# Patient Record
Sex: FEMALE | Race: WHITE | NOT HISPANIC OR LATINO | Employment: PART TIME | URBAN - METROPOLITAN AREA
[De-identification: names, ages, dates, MRNs, and addresses within clinical notes are randomized per-mention and may not be internally consistent; named-entity substitution may affect disease eponyms.]

---

## 2017-02-21 ENCOUNTER — TELEPHONE (OUTPATIENT)
Dept: OBSTETRICS AND GYNECOLOGY | Facility: CLINIC | Age: 22
End: 2017-02-21

## 2017-02-21 ENCOUNTER — LAB VISIT (OUTPATIENT)
Dept: LAB | Facility: HOSPITAL | Age: 22
End: 2017-02-21
Attending: OBSTETRICS & GYNECOLOGY
Payer: MEDICAID

## 2017-02-21 ENCOUNTER — OFFICE VISIT (OUTPATIENT)
Dept: OBSTETRICS AND GYNECOLOGY | Facility: CLINIC | Age: 22
End: 2017-02-21
Payer: MEDICAID

## 2017-02-21 VITALS
SYSTOLIC BLOOD PRESSURE: 122 MMHG | WEIGHT: 253.38 LBS | BODY MASS INDEX: 39.77 KG/M2 | HEART RATE: 69 BPM | RESPIRATION RATE: 14 BRPM | DIASTOLIC BLOOD PRESSURE: 74 MMHG | HEIGHT: 67 IN

## 2017-02-21 DIAGNOSIS — N93.9 ABNORMAL UTERINE BLEEDING: ICD-10-CM

## 2017-02-21 DIAGNOSIS — R11.0 NAUSEA: ICD-10-CM

## 2017-02-21 DIAGNOSIS — N93.9 ABNORMAL UTERINE BLEEDING: Primary | ICD-10-CM

## 2017-02-21 LAB
BASOPHILS # BLD AUTO: 0.09 K/UL
BASOPHILS NFR BLD: 1 %
DIFFERENTIAL METHOD: NORMAL
EOSINOPHIL # BLD AUTO: 0.5 K/UL
EOSINOPHIL NFR BLD: 5.8 %
ERYTHROCYTE [DISTWIDTH] IN BLOOD BY AUTOMATED COUNT: 13.8 %
HCT VFR BLD AUTO: 38 %
HGB BLD-MCNC: 12.4 G/DL
LYMPHOCYTES # BLD AUTO: 2.7 K/UL
LYMPHOCYTES NFR BLD: 31.9 %
MCH RBC QN AUTO: 28.1 PG
MCHC RBC AUTO-ENTMCNC: 32.6 %
MCV RBC AUTO: 86 FL
MONOCYTES # BLD AUTO: 0.7 K/UL
MONOCYTES NFR BLD: 8.6 %
NEUTROPHILS # BLD AUTO: 4.5 K/UL
NEUTROPHILS NFR BLD: 52.7 %
PLATELET # BLD AUTO: 238 K/UL
PMV BLD AUTO: 11.5 FL
RBC # BLD AUTO: 4.42 M/UL
WBC # BLD AUTO: 8.59 K/UL

## 2017-02-21 PROCEDURE — 85025 COMPLETE CBC W/AUTO DIFF WBC: CPT

## 2017-02-21 PROCEDURE — 99213 OFFICE O/P EST LOW 20 MIN: CPT | Mod: S$PBB,,, | Performed by: OBSTETRICS & GYNECOLOGY

## 2017-02-21 PROCEDURE — 36415 COLL VENOUS BLD VENIPUNCTURE: CPT

## 2017-02-21 PROCEDURE — 99999 PR PBB SHADOW E&M-EST. PATIENT-LVL III: CPT | Mod: PBBFAC,,, | Performed by: OBSTETRICS & GYNECOLOGY

## 2017-02-21 RX ORDER — PREDNISONE 10 MG/1
TABLET ORAL
Refills: 0 | COMMUNITY
Start: 2016-12-26 | End: 2017-02-21

## 2017-02-21 RX ORDER — FLUTICASONE PROPIONATE 50 MCG
SPRAY, SUSPENSION (ML) NASAL
Refills: 2 | COMMUNITY
Start: 2016-12-26 | End: 2017-02-21

## 2017-02-21 RX ORDER — CEFDINIR 300 MG/1
CAPSULE ORAL
Refills: 0 | COMMUNITY
Start: 2016-12-26 | End: 2017-02-21

## 2017-02-21 RX ORDER — ONDANSETRON 8 MG/1
TABLET, ORALLY DISINTEGRATING ORAL
Refills: 0 | COMMUNITY
Start: 2016-12-26 | End: 2017-02-21

## 2017-02-21 RX ORDER — MEDROXYPROGESTERONE ACETATE 10 MG/1
TABLET ORAL
Refills: 6 | COMMUNITY
Start: 2017-02-05 | End: 2017-11-27

## 2017-02-21 RX ORDER — NORETHINDRONE ACETATE AND ETHINYL ESTRADIOL 1.5-30(21)
KIT ORAL
Qty: 28 TABLET | Refills: 12 | Status: SHIPPED | OUTPATIENT
Start: 2017-02-21 | End: 2017-11-27

## 2017-02-21 RX ORDER — ONDANSETRON 4 MG/1
4 TABLET, ORALLY DISINTEGRATING ORAL EVERY 6 HOURS PRN
Qty: 30 TABLET | Refills: 0 | Status: SHIPPED | OUTPATIENT
Start: 2017-02-21 | End: 2017-11-27 | Stop reason: SDUPTHER

## 2017-02-21 NOTE — TELEPHONE ENCOUNTER
----- Message from Kelly Ortega MA sent at 2017 10:06 AM CST -----  Contact: Self   Gurjit Hernandez  MRN: [id number]  : 1995  PCP: Dong Calloway  Home Phone      [phone]  Work Phone      Not on file.  Mobile          [phone]      MESSAGE:  Patient would like to talk to a nurse about excesses bleeding with nausea and light headed please call the patient back [phone]. If you call the patient back she may be in class. She was calling on her break.

## 2017-02-21 NOTE — PROGRESS NOTES
Subjective:       Patient ID: Gurjit Hernandez is a 22 y.o. female.    Chief Complaint:  Vaginal Bleeding (pt states she is going trough a super plus tampon and a maxi pad a hr for 6days now, pt states she has been bleeding since the 9th but it has just recently gotten heavy with a lot of clots); Dysmenorrhea; Fatigue; and Weakness      History of Present Illness  HPI  Dysfunctional Uterine Bleeding  Patient complains of irregular menses. She had been bleeding infrequently and took a course of Provera after not having a cycle for 3 months. She is now bleeding every day for the past 13 days. She changes her pad or tampon every hour and has passage of clots. She also reports fatigue.    Reports that her PCP has blood work pending, including thyroid tests.  Will get results tomorrow.    GYN & OB History  Patient's last menstrual period was 02/09/2017.   Date of Last Pap: 12/12/2016    OB History   No data available       Review of Systems  Review of Systems   Constitutional: Positive for fatigue and unexpected weight change (unable to lose weight despite diet). Negative for chills, diaphoresis and fever.   HENT: Negative for congestion, hearing loss, rhinorrhea and sore throat.    Eyes: Negative for pain, discharge and visual disturbance.   Respiratory: Negative for apnea, cough, shortness of breath and wheezing.    Cardiovascular: Negative for chest pain, palpitations and leg swelling.   Gastrointestinal: Positive for nausea. Negative for abdominal pain, constipation, diarrhea and vomiting.   Endocrine: Negative for cold intolerance and heat intolerance.   Genitourinary: Positive for menstrual problem and vaginal bleeding. Negative for difficulty urinating, dyspareunia, dysuria, flank pain, frequency, genital sores, hematuria, pelvic pain, vaginal discharge and vaginal pain.   Musculoskeletal: Negative for arthralgias, back pain and joint swelling.   Skin: Negative for rash.   Neurological: Negative for dizziness,  weakness, light-headedness, numbness and headaches.   Psychiatric/Behavioral: Negative for agitation and confusion. The patient is not nervous/anxious.            Objective:    Physical Exam:   Constitutional: She is oriented to person, place, and time. She appears well-developed and well-nourished. No distress.    HENT:   Head: Normocephalic and atraumatic.    Eyes: Conjunctivae and EOM are normal.    Neck: Normal range of motion. Neck supple.     Pulmonary/Chest: Effort normal.        Abdominal: Soft. She exhibits no distension and no mass. There is no tenderness. There is no rebound and no guarding.             Musculoskeletal: Normal range of motion. She exhibits no deformity.       Neurological: She is alert and oriented to person, place, and time.    Skin: Skin is warm and dry.    Psychiatric: She has a normal mood and affect. Her behavior is normal. Judgment and thought content normal.          Assessment:        1. Abnormal uterine bleeding    2. Nausea             Plan:      Gurjit was seen today for vaginal bleeding, dysmenorrhea, fatigue and weakness.    Diagnoses and all orders for this visit:    Abnormal uterine bleeding  -     norethindrone-ethinyl estradiol-iron (MICROGESTIN FE1.5/30) 1.5 mg-30 mcg (21)/75 mg (7) tablet; Take 3 pills daily for 3 days, then 2 pills daily for 2 days, then one pill daily for all subsequent days and pill packs.  -     CBC auto differential; Future    Nausea  -     ondansetron (ZOFRAN-ODT) 4 MG TbDL; Take 1 tablet (4 mg total) by mouth every 6 (six) hours as needed (Nausea).    Follow up in 3 months or prn if symptoms worsen or fail to improve.

## 2017-02-21 NOTE — MR AVS SNAPSHOT
[facility]  [address] [place] [id number]  Phone: [phone]                  Gurjit Hernandez   2017 11:45 AM   Office Visit    Description:  Female : 1995   Provider:  Patricia Veloz MD   Department:  [facility]           Reason for Visit     Vaginal Bleeding     Dysmenorrhea     Fatigue     Weakness           Diagnoses this Visit        Comments    Abnormal uterine bleeding    -  Primary     Nausea                To Do List           Goals (5 Years of Data)     None      Follow-Up and Disposition     Return in about 3 months (around 2017).    Follow-up and Disposition History       These Medications        Disp Refills Start End    norethindrone-ethinyl estradiol-iron (MICROGESTIN FE1.5/30) 1.5 mg-30 mcg (21)/75 mg (7) tablet 28 tablet 12 2017     Take 3 pills daily for 3 days, then 2 pills daily for 2 days, then one pill daily for all subsequent days and pill packs.    Pharmacy: [facility] [address] [place] [address] Ph #: [phone]       ondansetron (ZOFRAN-ODT) 4 MG TbDL 30 tablet 0 2017 3/23/2017    Take 1 tablet (4 mg total) by mouth every 6 (six) hours as needed (Nausea). - Oral    [facility] [address] [address] Ph #: [phone]         [facility]     [facility] On Call Nurse Care Line -  Assistance  Registered nurses in the [facility] provide clinical advisement, health education, appointment booking, and other advisory services.  Call for this free service at [phone].             Medications           START taking these NEW medications        Refills    norethindrone-ethinyl estradiol-iron (MICROGESTIN FE1.5/30) 1.5 mg-30 mcg (21)/75 mg (7) tablet 12    Sig: Take 3 pills daily for 3 days, then 2 pills daily for 2 days, then one pill daily for all subsequent days and pill  "packs.    Class: Normal    ondansetron (ZOFRAN-ODT) 4 MG TbDL 0    Sig: Take 1 tablet (4 mg total) by mouth every 6 (six) hours as needed (Nausea).    Class: Normal    Route: Oral      STOP taking these medications     cefdinir (OMNICEF) 300 MG capsule TK ONE C PO  Q 12 H FOR 7 DAYS    fluticasone (FLONASE) 50 mcg/actuation nasal spray USE 2 SPRAYS IN EACH NOSTRIL BID    predniSONE (DELTASONE) 10 MG tablet TK 2 TS PO ONCE D FOR 7 DAYS           Verify that the below list of medications is an accurate representation of the medications you are currently taking.  If none reported, the list may be blank. If incorrect, please contact your healthcare provider. Carry this list with you in case of emergency.           Current Medications     buPROPion (WELLBUTRIN XL) 300 MG 24 hr tablet TK 1 T PO QAM    medroxyPROGESTERone (PROVERA) 10 MG tablet     norethindrone-ethinyl estradiol-iron (MICROGESTIN FE1.5/30) 1.5 mg-30 mcg (21)/75 mg (7) tablet Take 3 pills daily for 3 days, then 2 pills daily for 2 days, then one pill daily for all subsequent days and pill packs.    ondansetron (ZOFRAN-ODT) 4 MG TbDL Take 1 tablet (4 mg total) by mouth every 6 (six) hours as needed (Nausea).           Clinical Reference Information           Your Vitals Were     BP Pulse Resp Height Weight Last Period    122/74 69 14 5' 7" (1.702 m) 114.9 kg (253 lb 6.4 oz) 02/09/2017    BMI                39.69 kg/m2          Blood Pressure          Most Recent Value    BP  122/74      Allergies as of 2/21/2017     Bactrim [Sulfamethoxazole-trimethoprim]      Immunizations Administered on Date of Encounter - 2/21/2017     None      Orders Placed During Today's Visit     Future Labs/Procedures Expected by Expires    CBC auto differential  2/21/2017 4/22/2018      Language Assistance Services     ATTENTION: Language assistance services are available, free of charge. Please call [phone].      ATENCIÓN: Si dalilala reese, tiene a sharpe disposición servicios " deysios de asistencia lingüística. Héctor esteves [phone].     KEYONA Ý: N?u b?n nói Ti?ng Vi?t, có các d?ch v? h? tr? ngôn ng? mi?n phí dành cho b?n. G?i s? [phone].         [facility] complies with applicable Federal civil rights laws and does not discriminate on the basis of race, color, national origin, age, disability, or sex.

## 2017-02-21 NOTE — TELEPHONE ENCOUNTER
Patient states since she started Provera a few months ago she is experiencing irregular bleeding.Patient states she feels like something is wrong since starting Provera. Requesting evaluation today. Patient denies saturating a pad an hour, cramping, pain, fever, nausea, vomiting, or any other symptoms.

## 2017-11-27 ENCOUNTER — HOSPITAL ENCOUNTER (OUTPATIENT)
Dept: RADIOLOGY | Facility: HOSPITAL | Age: 22
Discharge: HOME | End: 2017-11-27
Attending: OBSTETRICS & GYNECOLOGY
Payer: MEDICAID

## 2017-11-27 ENCOUNTER — OFFICE VISIT (OUTPATIENT)
Dept: OBSTETRICS AND GYNECOLOGY | Facility: CLINIC | Age: 22
End: 2017-11-27
Payer: MEDICAID

## 2017-11-27 VITALS
HEIGHT: 67 IN | RESPIRATION RATE: 16 BRPM | WEIGHT: 248 LBS | SYSTOLIC BLOOD PRESSURE: 110 MMHG | BODY MASS INDEX: 38.92 KG/M2 | DIASTOLIC BLOOD PRESSURE: 78 MMHG | HEART RATE: 76 BPM

## 2017-11-27 DIAGNOSIS — R11.0 NAUSEA: ICD-10-CM

## 2017-11-27 DIAGNOSIS — E28.2 PCOS (POLYCYSTIC OVARIAN SYNDROME): Primary | ICD-10-CM

## 2017-11-27 DIAGNOSIS — R42 DIZZINESS: ICD-10-CM

## 2017-11-27 DIAGNOSIS — G43.109 MIGRAINE WITH AURA AND WITHOUT STATUS MIGRAINOSUS, NOT INTRACTABLE: ICD-10-CM

## 2017-11-27 DIAGNOSIS — N93.8 DUB (DYSFUNCTIONAL UTERINE BLEEDING): ICD-10-CM

## 2017-11-27 PROCEDURE — 99213 OFFICE O/P EST LOW 20 MIN: CPT | Mod: PBBFAC,25 | Performed by: OBSTETRICS & GYNECOLOGY

## 2017-11-27 PROCEDURE — 99213 OFFICE O/P EST LOW 20 MIN: CPT | Mod: S$PBB,,, | Performed by: OBSTETRICS & GYNECOLOGY

## 2017-11-27 PROCEDURE — 99999 PR PBB SHADOW E&M-EST. PATIENT-LVL III: CPT | Mod: PBBFAC,,, | Performed by: OBSTETRICS & GYNECOLOGY

## 2017-11-27 PROCEDURE — 70470 CT HEAD/BRAIN W/O & W/DYE: CPT | Mod: TC

## 2017-11-27 PROCEDURE — 70470 CT HEAD/BRAIN W/O & W/DYE: CPT | Mod: 26,,, | Performed by: RADIOLOGY

## 2017-11-27 PROCEDURE — 25500020 PHARM REV CODE 255: Performed by: OBSTETRICS & GYNECOLOGY

## 2017-11-27 RX ORDER — METFORMIN HYDROCHLORIDE 500 MG/1
500 TABLET, EXTENDED RELEASE ORAL
Qty: 30 TABLET | Refills: 11 | Status: SHIPPED | OUTPATIENT
Start: 2017-11-27 | End: 2019-02-14

## 2017-11-27 RX ORDER — NORETHINDRONE 5 MG/1
5 TABLET ORAL DAILY
Qty: 10 TABLET | Refills: 3 | Status: ON HOLD | OUTPATIENT
Start: 2017-11-27 | End: 2019-02-06 | Stop reason: HOSPADM

## 2017-11-27 RX ORDER — ONDANSETRON 4 MG/1
4 TABLET, ORALLY DISINTEGRATING ORAL EVERY 6 HOURS PRN
Qty: 30 TABLET | Refills: 0 | Status: SHIPPED | OUTPATIENT
Start: 2017-11-27 | End: 2018-05-04 | Stop reason: SDUPTHER

## 2017-11-27 RX ADMIN — IOHEXOL 100 ML: 350 INJECTION, SOLUTION INTRAVENOUS at 11:11

## 2017-11-27 NOTE — PROGRESS NOTES
Subjective:    Patient ID: Gurjit Hernandez is a 22 y.o. y.o. female    Chief Complaint:   Chief Complaint   Patient presents with    Headache    Dizziness     seeing spots    Nausea    Vaginal Bleeding     since 10/4/2017- heavy to light, crampng with clots        History of Present Illness:  Gurjit presents today for evaluation of an episode of dizziness, headache, nausea which began yesterday and lasted for about 3 hours. She states she had difficulty walking due to dizziness. She had one episode like this before about 8 months ago. She had been on OC's for PCOS shelia stopped in July. She has had irregular bleeding since that time and states she has had daily bleeding since early October. She denies pelvic pain.      Review of Systems   Constitutional: Negative for activity change, appetite change, chills, diaphoresis, fatigue, fever and unexpected weight change.   HENT: Negative for mouth sores and tinnitus.    Eyes: Negative for discharge and visual disturbance.   Respiratory: Negative for cough, shortness of breath and wheezing.    Cardiovascular: Negative for chest pain, palpitations and leg swelling.   Gastrointestinal: Negative for abdominal pain, blood in stool, constipation, diarrhea, nausea and vomiting.   Endocrine: Negative for diabetes, hair loss, hot flashes, hyperthyroidism and hypothyroidism.   Genitourinary: Positive for menstrual problem. Negative for decreased libido, dyspareunia, dysuria, flank pain, frequency, genital sores, hematuria, menorrhagia, pelvic pain, urgency, vaginal bleeding, vaginal discharge, vaginal pain, urinary incontinence, postcoital bleeding and vaginal odor.   Musculoskeletal: Negative for back pain, joint swelling and myalgias.   Skin:  Negative for rash, no acne and hair changes.   Neurological: Positive for headaches. Negative for seizures, syncope and numbness.        Dizziness   Hematological: Negative for adenopathy. Does not bruise/bleed easily.    Psychiatric/Behavioral: Negative for sleep disturbance. The patient is not nervous/anxious.    Breast: Negative for breast pain and nipple discharge          Objective:    Vital Signs:  Vitals:    11/27/17 0934   BP: 110/78   Pulse: 76   Resp: 16       Physical Exam:  General:  alert,normal appearing gravid female   Skin:  Skin color, texture, turgor normal. No rashes or lesions   Abdomen: soft, non-tender. Bowel sounds normal. No masses,  no organomegaly   Pelvis: External genitalia: normal general appearance  Urinary system: urethral meatus normal, bladder nontender  Vaginal: normal mucosa without prolapse or lesions  Cervix: normal appearance  Uterus: normal single, nontender  Adnexa: normal bimanual exam         Assessment:      1. PCOS (polycystic ovarian syndrome)    2. Dizziness    3. DUB (dysfunctional uterine bleeding)    4. Nausea    5. Migraine with aura and without status migrainosus, not intractable          Plan:      PCOS (polycystic ovarian syndrome)  -     norethindrone (AYGESTIN) 5 mg Tab; Take 1 tablet (5 mg total) by mouth once daily.  Dispense: 10 tablet; Refill: 3  -     metFORMIN (GLUCOPHAGE XR) 500 MG 24 hr tablet; Take 1 tablet (500 mg total) by mouth daily with breakfast.  Dispense: 30 tablet; Refill: 11    Dizziness  -     CT Head W Wo Contrast; Future; Expected date: 11/27/2017    DUB (dysfunctional uterine bleeding)  -     CBC auto differential; Future; Expected date: 11/27/2017    Nausea  -     ondansetron (ZOFRAN-ODT) 4 MG TbDL; Take 1 tablet (4 mg total) by mouth every 6 (six) hours as needed (Nausea).  Dispense: 30 tablet; Refill: 0    Migraine with aura and without status migrainosus, not intractable      We discussed taking metformin as I am cautious to start OC's with this type of migraine. If repeats, I will refer to neurology.

## 2017-11-29 ENCOUNTER — TELEPHONE (OUTPATIENT)
Dept: OBSTETRICS AND GYNECOLOGY | Facility: CLINIC | Age: 22
End: 2017-11-29

## 2017-11-29 NOTE — TELEPHONE ENCOUNTER
Pt seen in clinic 11/27/17 states she was instructed to contact office if migraine symptoms continued. States headaches have not gotten better, has no relief from tylenol. Also has fatigue. Pt informed MD not in office today, but message will be sent for review. Also instructed to schedule appt with PCP for soonest available for further evaluation. verbalized understanding. Please advise.

## 2017-11-29 NOTE — TELEPHONE ENCOUNTER
----- Message from Surekha Muniz MA sent at 11/29/2017 10:01 AM CST -----  Patient saw DR. Santos on 11/27/2017 due to increase in headaches with the increase in hormones. The patient states that the physician told her to call back if the headaches continues with no relief. She is still having problems.Her pharmacy is [facility] in [place] and her call back number is [phone].

## 2018-05-01 ENCOUNTER — TELEPHONE (OUTPATIENT)
Dept: OBSTETRICS AND GYNECOLOGY | Facility: CLINIC | Age: 23
End: 2018-05-01

## 2018-05-01 NOTE — TELEPHONE ENCOUNTER
I have spoken with pharmacist Osmani who states Aygestin is in stock and will be filled today. Left message for patient to contact office.

## 2018-05-01 NOTE — TELEPHONE ENCOUNTER
----- Message from Salima Red MA sent at 5/1/2018  1:02 PM CDT -----  Contact: self  Gurjit Hernandez  MRN: [id number]  Home Phone      [phone]  Work Phone      Not on file.  Mobile          [phone]    Patient Care Team:  Dong Calloway MD as PCP - General (Family Medicine)  Patricia Veloz MD as Consulting Physician (Obstetrics and Gynecology)  OB? No  What phone number can you be reached at?   [phone]  Message:   Following up on Hormone medication that was to be called in back in February.    Pharmacy:  [facility] [address]

## 2018-05-04 ENCOUNTER — TELEPHONE (OUTPATIENT)
Dept: OBSTETRICS AND GYNECOLOGY | Facility: CLINIC | Age: 23
End: 2018-05-04

## 2018-05-04 DIAGNOSIS — R11.0 NAUSEA: ICD-10-CM

## 2018-05-04 RX ORDER — ONDANSETRON 4 MG/1
4 TABLET, ORALLY DISINTEGRATING ORAL EVERY 6 HOURS PRN
Qty: 30 TABLET | Refills: 0 | Status: SHIPPED | OUTPATIENT
Start: 2018-05-04 | End: 2018-06-03

## 2018-05-04 NOTE — TELEPHONE ENCOUNTER
Pt requesting a rx for nausea medication. OTC medications have not helped. LOV 11/27/17    Calvin Mcdaniel

## 2018-05-04 NOTE — TELEPHONE ENCOUNTER
Please advise Gurjit that I have refilled her medication to help her over the weekend, however, if she persists with nausea, I would recommend she see her family doctor for evaluation.

## 2018-05-04 NOTE — TELEPHONE ENCOUNTER
----- Message from Emilee Guo sent at 5/4/2018 12:17 PM CDT -----  Pt would like something for nausea called into [facility] in [place] on [address]. Pt can be contacted at [phone].

## 2018-05-21 ENCOUNTER — OFFICE VISIT (OUTPATIENT)
Dept: URGENT CARE | Facility: CLINIC | Age: 23
End: 2018-05-21
Payer: MEDICAID

## 2018-05-21 VITALS
TEMPERATURE: 98 F | HEART RATE: 101 BPM | OXYGEN SATURATION: 99 % | DIASTOLIC BLOOD PRESSURE: 82 MMHG | WEIGHT: 240 LBS | HEIGHT: 67 IN | SYSTOLIC BLOOD PRESSURE: 132 MMHG | RESPIRATION RATE: 20 BRPM | BODY MASS INDEX: 37.67 KG/M2

## 2018-05-21 DIAGNOSIS — J98.01 BRONCHOSPASM: ICD-10-CM

## 2018-05-21 DIAGNOSIS — B34.9 VIRAL SYNDROME: Primary | ICD-10-CM

## 2018-05-21 PROCEDURE — 94640 AIRWAY INHALATION TREATMENT: CPT | Mod: S$GLB,,, | Performed by: FAMILY MEDICINE

## 2018-05-21 PROCEDURE — 99204 OFFICE O/P NEW MOD 45 MIN: CPT | Mod: S$GLB,,, | Performed by: PHYSICIAN ASSISTANT

## 2018-05-21 RX ORDER — PROMETHAZINE HYDROCHLORIDE AND PHENYLEPHRINE HYDROCHLORIDE 6.25; 5 MG/5ML; MG/5ML
5 SYRUP ORAL 4 TIMES DAILY PRN
Qty: 118 ML | Refills: 0 | Status: SHIPPED | OUTPATIENT
Start: 2018-05-21 | End: 2018-05-31

## 2018-05-21 RX ORDER — ALBUTEROL SULFATE 0.83 MG/ML
SOLUTION RESPIRATORY (INHALATION)
COMMUNITY
Start: 2018-04-11

## 2018-05-21 RX ORDER — DEXAMETHASONE SODIUM PHOSPHATE 100 MG/10ML
8 INJECTION INTRAMUSCULAR; INTRAVENOUS
Status: COMPLETED | OUTPATIENT
Start: 2018-05-21 | End: 2018-05-21

## 2018-05-21 RX ORDER — AZELASTINE 1 MG/ML
1 SPRAY, METERED NASAL 2 TIMES DAILY
Qty: 30 ML | Refills: 0 | Status: SHIPPED | OUTPATIENT
Start: 2018-05-21 | End: 2019-05-21

## 2018-05-21 RX ORDER — IPRATROPIUM BROMIDE 0.5 MG/2.5ML
0.5 SOLUTION RESPIRATORY (INHALATION)
Status: COMPLETED | OUTPATIENT
Start: 2018-05-21 | End: 2018-05-21

## 2018-05-21 RX ORDER — ALBUTEROL SULFATE 0.83 MG/ML
2.5 SOLUTION RESPIRATORY (INHALATION)
Status: COMPLETED | OUTPATIENT
Start: 2018-05-21 | End: 2018-05-21

## 2018-05-21 RX ORDER — BENZONATATE 200 MG/1
200 CAPSULE ORAL 3 TIMES DAILY PRN
Qty: 30 CAPSULE | Refills: 0 | Status: SHIPPED | OUTPATIENT
Start: 2018-05-21 | End: 2018-05-31

## 2018-05-21 RX ORDER — ALBUTEROL SULFATE 90 UG/1
2 AEROSOL, METERED RESPIRATORY (INHALATION) EVERY 4 HOURS PRN
COMMUNITY
Start: 2018-04-11 | End: 2019-07-12 | Stop reason: SDUPTHER

## 2018-05-21 RX ADMIN — DEXAMETHASONE SODIUM PHOSPHATE 8 MG: 100 INJECTION INTRAMUSCULAR; INTRAVENOUS at 11:05

## 2018-05-21 RX ADMIN — IPRATROPIUM BROMIDE 0.5 MG: 0.5 SOLUTION RESPIRATORY (INHALATION) at 11:05

## 2018-05-21 RX ADMIN — ALBUTEROL SULFATE 2.5 MG: 0.83 SOLUTION RESPIRATORY (INHALATION) at 11:05

## 2018-05-21 NOTE — PROGRESS NOTES
"Subjective:       Patient ID: Gurjit Hernandez is a 23 y.o. female.    Vitals:  height is 5' 7" (1.702 m) and weight is 108.9 kg (240 lb). Her oral temperature is 98.3 °F (36.8 °C). Her blood pressure is 132/82 and her pulse is 101. Her respiration is 20 and oxygen saturation is 99%.     Chief Complaint: Cough    Cough   This is a new problem. The current episode started yesterday. The problem has been gradually worsening. The problem occurs constantly. The cough is productive of purulent sputum. Associated symptoms include nasal congestion, postnasal drip and wheezing. Pertinent negatives include no chest pain, chills, ear pain, eye redness, fever, headaches, myalgias, sore throat or shortness of breath. Nothing aggravates the symptoms. She has tried OTC inhaler and OTC cough suppressant for the symptoms. The treatment provided mild relief.     Review of Systems   Constitution: Negative for chills, fever and malaise/fatigue.   HENT: Positive for congestion and postnasal drip. Negative for ear pain, hoarse voice and sore throat.    Eyes: Negative for discharge and redness.   Cardiovascular: Negative for chest pain, dyspnea on exertion and leg swelling.   Respiratory: Positive for cough, sputum production and wheezing. Negative for shortness of breath.    Musculoskeletal: Negative for myalgias.   Gastrointestinal: Negative for abdominal pain and nausea.   Neurological: Negative for headaches.       Objective:      Physical Exam   Constitutional: She is oriented to person, place, and time. She appears well-developed and well-nourished. She is cooperative.  Non-toxic appearance. She does not appear ill. No distress.   HENT:   Head: Normocephalic and atraumatic.   Right Ear: Hearing, tympanic membrane, external ear and ear canal normal.   Left Ear: Hearing, tympanic membrane, external ear and ear canal normal.   Nose: Mucosal edema present. No rhinorrhea or nasal deformity. No epistaxis. Right sinus exhibits no " maxillary sinus tenderness and no frontal sinus tenderness. Left sinus exhibits no maxillary sinus tenderness and no frontal sinus tenderness.   Mouth/Throat: Uvula is midline and mucous membranes are normal. No trismus in the jaw. Normal dentition. No uvula swelling. No posterior oropharyngeal erythema (post nasal drainage present).   Eyes: Conjunctivae and lids are normal. No scleral icterus.   Sclera clear bilat   Neck: Trachea normal, full passive range of motion without pain and phonation normal. Neck supple.   Cardiovascular: Normal rate, regular rhythm, normal heart sounds, intact distal pulses and normal pulses.    Pulmonary/Chest: Effort normal. No respiratory distress. She has no decreased breath sounds. She has wheezes. She has no rhonchi. She has no rales.   No wheezes post duoneb.   Abdominal: Soft. Normal appearance and bowel sounds are normal. She exhibits no distension. There is no tenderness.   Musculoskeletal: Normal range of motion. She exhibits no edema or deformity.   Neurological: She is alert and oriented to person, place, and time. She exhibits normal muscle tone. Coordination normal.   Skin: Skin is warm, dry and intact. She is not diaphoretic. No pallor.   Psychiatric: She has a normal mood and affect. Her speech is normal and behavior is normal. Judgment and thought content normal. Cognition and memory are normal.   Nursing note and vitals reviewed.      Assessment:       1. Viral syndrome    2. Bronchospasm        Plan:         Viral syndrome    Bronchospasm  -     albuterol nebulizer solution 2.5 mg; Take 3 mLs (2.5 mg total) by nebulization one time.  -     ipratropium 0.02 % nebulizer solution 0.5 mg; Take 2.5 mLs (0.5 mg total) by nebulization one time.    Other orders  -     azelastine (ASTELIN) 137 mcg (0.1 %) nasal spray; 1 spray (137 mcg total) by Nasal route 2 (two) times daily.  Dispense: 30 mL; Refill: 0  -     dexamethasone injection 8 mg; Inject 0.8 mLs (8 mg total) into the  "muscle one time.  -     promethazine-phenylephrine (PROMETHAZINE VC) 6.25-5 mg/5 mL Syrp; Take 5 mLs by mouth 4 (four) times daily as needed.  Dispense: 118 mL; Refill: 0  -     benzonatate (TESSALON) 200 MG capsule; Take 1 capsule (200 mg total) by mouth 3 (three) times daily as needed for Cough.  Dispense: 30 capsule; Refill: 0      Patient Instructions   · Follow up with primary care or a specialist, or you may return here.  · If you were prescribed antibiotics, please take them to completion.  · If you were prescribed a narcotic or any medication with sedative effects, do not drive or operate heavy equipment or machinery while taking these medications.  · Please go to the Emergency Department for worsening symptoms, or possibly life threatening conditions as discussed.                                        If you  smoke, please stop smoking        Viral Syndrome (Adult)  A viral illness may cause a number of symptoms. The symptoms depend on the part of the body that the virus affects. If it settles in your nose, throat, and lungs, it may cause cough, sore throat, congestion, and sometimes headache. If it settles in your stomach and intestinal tract, it may cause vomiting and diarrhea. Sometimes it causes vague symptoms like "aching all over," feeling tired, loss of appetite, or fever.  A viral illness usually lasts 1 to 2 weeks, but sometimes it lasts longer. In some cases, a more serious infection can look like a viral syndrome in the first few days of the illness. You may need another exam and additional tests to know the difference. Watch for the warning signs listed below.  Home care  Follow these guidelines for taking care of yourself at home:  · If symptoms are severe, rest at home for the first 2 to 3 days.  · Stay away from cigarette smoke - both your smoke and the smoke from others.  · You may use over-the-counter acetaminophen or ibuprofen for fever, muscle aching, and headache, unless another " medicine was prescribed for this. If you have chronic liver or kidney disease or ever had a stomach ulcer or GI bleeding, talk with your doctor before using these medicines. No one who is younger than 18 and ill with a fever should take aspirin. It may cause severe disease or death.  · Your appetite may be poor, so a light diet is fine. Avoid dehydration by drinking 8 to 12 8-ounce glasses of fluids each day. This may include water; orange juice; lemonade; apple, grape, and cranberry juice; clear fruit drinks; electrolyte replacement and sports drinks; and decaffeinated teas and coffee. If you have been diagnosed with a kidney disease, ask your doctor how much and what types of fluids you should drink to prevent dehydration. If you have kidney disease, drinking too much fluid can cause it build up in the your body and be dangerous to your health.  · Over-the-counter remedies won't shorten the length of the illness but may be helpful for cough, sore throat; and nasal and sinus congestion. Don't use decongestants if you have high blood pressure.  Follow-up care  Follow up with your healthcare provider if you do not improve over the next week.  Call 911  Get emergency medical care if any of the following occur:  · Convulsion  · Feeling weak, dizzy, or like you are going to faint  · Chest pain, shortness of breath, wheezing, or difficulty breathing  When to seek medical advice  Call your healthcare provider right away if any of these occur:  · Cough with lots of colored sputum (mucus) or blood in your sputum  · Chest pain, shortness of breath, wheezing, or difficulty breathing  · Severe headache; face, neck, or ear pain  · Severe, constant pain in the lower right side of your belly (abdominal)  · Continued vomiting (cant keep liquids down)  · Frequent diarrhea (more than 5 times a day); blood (red or black color) or mucus in diarrhea  · Feeling weak, dizzy, or like you are going to faint  · Extreme thirst  · Fever of  100.4°F (38°C) or higher, or as directed by your healthcare provider  Date Last Reviewed: 9/25/2015  © 9045-2876 [employer]. [address] [place] [address], [place] [address]. All rights reserved. This information is not intended as a substitute for professional medical care. Always follow your healthcare professional's instructions.            Bronchospasm (Adult)    Bronchospasm occurs when the airways (bronchial tubes) go into spasm and contract. This makes it hard to breathe and causes wheezing (a high-pitched whistling sound). Bronchospasm can also cause frequent coughing without wheezing.  Bronchospasm is due to irritation, inflammation, or allergic reaction of the airways. People with asthma get bronchospasm. However, not everyone with bronchospasm has asthma.  Being exposed to harmful fumes, a recent case of bronchitis, exercise, or a flare-up of chronic obstructive pulmonary disease (COPD) may cause the airways to spasm. An episode of bronchospasm may last 7 to 14 days. Medicine may be prescribed to relax the airways and prevent wheezing. Antibiotics will be prescribed only if your healthcare provider thinks there is a bacterial infection. Antibiotics do not help a viral infection.  Home care  · Drink lots of water or other fluids (at least 10 glasses a day) during an attack. This will loosen lung secretions and make it easier to breathe. If you have heart or kidney disease, check with your doctor before you drink extra fluids.  · Take prescribed medicine exactly at the times advised. If you take an inhaled medicine to help with breathing, do not use it more than once every 4 hours, unless told to do so. If prescribed an antibiotic or prednisone, take all of the medicine, even if you are feeling better after a few days.  · Do not smoke. Also avoid being exposed to secondhand smoke.  · If you were given an inhaler, use it exactly as directed. If you need to use it more often than prescribed, your  condition may be getting worse. Contact your healthcare provider.  Follow-up care  Follow up with your healthcare provider, or as advised.  Note: If you are age 65 or older, have a chronic lung disease or condition that affects your immune system, or you smoke, we recommend getting pneumococcal vaccinations, as well as an influenza vaccination (flu shot) every autumn. Ask your healthcare provider about this.  When to seek medical advice  Call your healthcare provider right away if any of these occur:  · You need to use your inhalers more often than usual.  · You develop a fever of 100.4°F (38°C) or higher.  · You are coughing up lots of dark-colored sputum (mucus).  · You do not start to improve within 24 hours.  Call 911, or get immediate medical care  Contact emergency services if any of these occur:  · Coughing up bloody sputum (mucus)  · Chest pain with each breath  · Increased wheezing or shortness of breath  Date Last Reviewed: 9/13/2015  © 0174-1312 [employer]. [address] [place] [address], [place] [address]. All rights reserved. This information is not intended as a substitute for professional medical care. Always follow your healthcare professional's instructions.

## 2018-05-21 NOTE — LETTER
May 21, 2018      [facility] [place]  [address], [address]  [place] [address]  Phone: [phone]  Fax: [phone]       Patient: Gurjit Hernandez   YOB: 1995  Date of Visit: 05/21/2018    To Whom It May Concern:    Cal Hernandez  was at [facility] on 05/21/2018. She may return to work/school on 5/22/2018 with no restrictions. If you have any questions or concerns, or if I can be of further assistance, please do not hesitate to contact me.    Sincerely,    Abiodun Loera PA-C

## 2018-05-21 NOTE — PATIENT INSTRUCTIONS
"· Follow up with primary care or a specialist, or you may return here.  · If you were prescribed antibiotics, please take them to completion.  · If you were prescribed a narcotic or any medication with sedative effects, do not drive or operate heavy equipment or machinery while taking these medications.  · Please go to the Emergency Department for worsening symptoms, or possibly life threatening conditions as discussed.                                        If you  smoke, please stop smoking        Viral Syndrome (Adult)  A viral illness may cause a number of symptoms. The symptoms depend on the part of the body that the virus affects. If it settles in your nose, throat, and lungs, it may cause cough, sore throat, congestion, and sometimes headache. If it settles in your stomach and intestinal tract, it may cause vomiting and diarrhea. Sometimes it causes vague symptoms like "aching all over," feeling tired, loss of appetite, or fever.  A viral illness usually lasts 1 to 2 weeks, but sometimes it lasts longer. In some cases, a more serious infection can look like a viral syndrome in the first few days of the illness. You may need another exam and additional tests to know the difference. Watch for the warning signs listed below.  Home care  Follow these guidelines for taking care of yourself at home:  · If symptoms are severe, rest at home for the first 2 to 3 days.  · Stay away from cigarette smoke - both your smoke and the smoke from others.  · You may use over-the-counter acetaminophen or ibuprofen for fever, muscle aching, and headache, unless another medicine was prescribed for this. If you have chronic liver or kidney disease or ever had a stomach ulcer or GI bleeding, talk with your doctor before using these medicines. No one who is younger than 18 and ill with a fever should take aspirin. It may cause severe disease or death.  · Your appetite may be poor, so a light diet is fine. Avoid dehydration by " drinking 8 to 12 8-ounce glasses of fluids each day. This may include water; orange juice; lemonade; apple, grape, and cranberry juice; clear fruit drinks; electrolyte replacement and sports drinks; and decaffeinated teas and coffee. If you have been diagnosed with a kidney disease, ask your doctor how much and what types of fluids you should drink to prevent dehydration. If you have kidney disease, drinking too much fluid can cause it build up in the your body and be dangerous to your health.  · Over-the-counter remedies won't shorten the length of the illness but may be helpful for cough, sore throat; and nasal and sinus congestion. Don't use decongestants if you have high blood pressure.  Follow-up care  Follow up with your healthcare provider if you do not improve over the next week.  Call 911  Get emergency medical care if any of the following occur:  · Convulsion  · Feeling weak, dizzy, or like you are going to faint  · Chest pain, shortness of breath, wheezing, or difficulty breathing  When to seek medical advice  Call your healthcare provider right away if any of these occur:  · Cough with lots of colored sputum (mucus) or blood in your sputum  · Chest pain, shortness of breath, wheezing, or difficulty breathing  · Severe headache; face, neck, or ear pain  · Severe, constant pain in the lower right side of your belly (abdominal)  · Continued vomiting (cant keep liquids down)  · Frequent diarrhea (more than 5 times a day); blood (red or black color) or mucus in diarrhea  · Feeling weak, dizzy, or like you are going to faint  · Extreme thirst  · Fever of 100.4°F (38°C) or higher, or as directed by your healthcare provider  Date Last Reviewed: 9/25/2015  © 0425-1961 [employer]. [address] [place] [address], [place] [address]. All rights reserved. This information is not intended as a substitute for professional medical care. Always follow your healthcare professional's  instructions.            Bronchospasm (Adult)    Bronchospasm occurs when the airways (bronchial tubes) go into spasm and contract. This makes it hard to breathe and causes wheezing (a high-pitched whistling sound). Bronchospasm can also cause frequent coughing without wheezing.  Bronchospasm is due to irritation, inflammation, or allergic reaction of the airways. People with asthma get bronchospasm. However, not everyone with bronchospasm has asthma.  Being exposed to harmful fumes, a recent case of bronchitis, exercise, or a flare-up of chronic obstructive pulmonary disease (COPD) may cause the airways to spasm. An episode of bronchospasm may last 7 to 14 days. Medicine may be prescribed to relax the airways and prevent wheezing. Antibiotics will be prescribed only if your healthcare provider thinks there is a bacterial infection. Antibiotics do not help a viral infection.  Home care  · Drink lots of water or other fluids (at least 10 glasses a day) during an attack. This will loosen lung secretions and make it easier to breathe. If you have heart or kidney disease, check with your doctor before you drink extra fluids.  · Take prescribed medicine exactly at the times advised. If you take an inhaled medicine to help with breathing, do not use it more than once every 4 hours, unless told to do so. If prescribed an antibiotic or prednisone, take all of the medicine, even if you are feeling better after a few days.  · Do not smoke. Also avoid being exposed to secondhand smoke.  · If you were given an inhaler, use it exactly as directed. If you need to use it more often than prescribed, your condition may be getting worse. Contact your healthcare provider.  Follow-up care  Follow up with your healthcare provider, or as advised.  Note: If you are age 65 or older, have a chronic lung disease or condition that affects your immune system, or you smoke, we recommend getting pneumococcal vaccinations, as well as an influenza  vaccination (flu shot) every autumn. Ask your healthcare provider about this.  When to seek medical advice  Call your healthcare provider right away if any of these occur:  · You need to use your inhalers more often than usual.  · You develop a fever of 100.4°F (38°C) or higher.  · You are coughing up lots of dark-colored sputum (mucus).  · You do not start to improve within 24 hours.  Call 911, or get immediate medical care  Contact emergency services if any of these occur:  · Coughing up bloody sputum (mucus)  · Chest pain with each breath  · Increased wheezing or shortness of breath  Date Last Reviewed: 9/13/2015  © 0560-2429 [employer]. [address] [place] [address], [place] [address]. All rights reserved. This information is not intended as a substitute for professional medical care. Always follow your healthcare professional's instructions.

## 2018-12-17 ENCOUNTER — OFFICE VISIT (OUTPATIENT)
Dept: URGENT CARE | Facility: CLINIC | Age: 23
End: 2018-12-17
Payer: COMMERCIAL

## 2018-12-17 VITALS
BODY MASS INDEX: 37.67 KG/M2 | HEIGHT: 67 IN | HEART RATE: 89 BPM | TEMPERATURE: 98 F | DIASTOLIC BLOOD PRESSURE: 81 MMHG | OXYGEN SATURATION: 99 % | WEIGHT: 240 LBS | SYSTOLIC BLOOD PRESSURE: 120 MMHG

## 2018-12-17 DIAGNOSIS — H00.014 HORDEOLUM EXTERNUM OF LEFT UPPER EYELID: Primary | ICD-10-CM

## 2018-12-17 PROCEDURE — 3008F BODY MASS INDEX DOCD: CPT | Mod: CPTII,S$GLB,, | Performed by: NURSE PRACTITIONER

## 2018-12-17 PROCEDURE — 99213 OFFICE O/P EST LOW 20 MIN: CPT | Mod: S$GLB,,, | Performed by: NURSE PRACTITIONER

## 2018-12-17 RX ORDER — ERYTHROMYCIN 5 MG/G
OINTMENT OPHTHALMIC EVERY 8 HOURS
Qty: 1 TUBE | Refills: 0 | Status: SHIPPED | OUTPATIENT
Start: 2018-12-17 | End: 2018-12-24

## 2018-12-17 NOTE — LETTER
December 17, 2018      [facility]   [place]  [address] [facility] [facility] [address]  Phone: [phone]  Fax: [phone]       Patient: Gurjit Xiong   YOB: 1995  Date of Visit: 12/17/2018    To Whom It May Concern:    Cal Xiong  was at [facility] on 12/17/2018. She may return to work/school on 12/18/18 with no restrictions. If you have any questions or concerns, or if I can be of further assistance, please do not hesitate to contact me.    Sincerely,    Annabelle Bermudez NP

## 2018-12-17 NOTE — PATIENT INSTRUCTIONS
Sty (or Stye)  A sty is an infection of the oil gland of the eyelid. It may develop into a small pocket of pus (abscess). This can cause pain, redness, and swelling. In early stages, styes are treated with antibiotic cream, eye drops, or warm packs (small towels soaked in warm water). More severe cases may need to be opened and drained by a health care provider.  Home care  · Eye drops or ointment are usually prescribed to treat the infection. Use these as directed.   ¨ Artificial tears may also be used to lubricate the eye and make it more comfortable. These may be purchased without a prescription.   ¨ Talk to your health care provider before using any over-the-counter treatment for a sty.  · Apply a warm, damp towel to the affected eye for at least 5 minutes, 3 to 4 times a day for a week. Warm compresses open the pores and speed the healing. If the compresses are too hot, they may burn your eyelid.  · Sometimes the sty will drain with this treatment alone. If this happens, continue the antibiotic until all the redness and swelling are gone.  · Wash your hands before and after touching the infected eye to avoid spreading the infection.  · Do not squeeze or try to puncture the sty.  Follow-up care  Follow up with your health care provider, or as advised.   When to seek medical advice  Call your health care provider right away if you have:  · Increase in swelling or redness around the eyelid after 48 to 72 hours  · Increase in eye pain or the eyelid blisters  · Increase in warmth--the eyelid feels hot  · Drainage of blood or thick pus from the sty  · Blister on the eyelid  · Inability to open the eyelid due to swelling  · Fever  ¨ 1 degree above your normal temperature lasting for 24 to 48 hours, or  ¨ Whatever your health care provider told you to report based on your medical condition  · Vision changes  · Headache or stiff neck  · Recurrence of the sty  Date Last Reviewed: 6/14/2015  © 0988-6896 [employer]. [address] [place] [address], [place] [address]. All rights reserved. This information is not intended as a substitute for professional medical care. Always follow your healthcare professional's instructions.      1.  Take all medications as directed. If you have been prescribed antibiotics, make sure to complete them.   2.  Rest and keep yourself/patient well hydrated. For adults, it is recommended to drink at least 8-10 glasses of water daily.   3.  For patients above 6 months of age who are not allergic to and are not on anticoagulants, you can alternate Tylenol and Motrin every 4-6 hours for fever above 100.4F and/or pain.  For patients less than 6 months of age, allergic to or intolerant to NSAIDS, have gastritis, gastric ulcers, or history of GI bleeds, are pregnant, or are on anticoagulant therapy, you can take Tylenol every 4 hours as needed for fever above 100.4F and/or pain.   4. You should schedule a follow-up appointment with your Primary Care Provider/Pediatrician for recheck in 2-3 days or as directed at this visit.   5.  If your condition fails to improve in a timely manner, you should receive another evaluation by your Primary Care Provider/Pediatrician to discuss your concerns or return to urgent care for a recheck.  If your condition worsens at any time, you should report immediately to your nearest Emergency Department for further evaluation. **You must understand that you have received Urgent Care treatment only and that you may be released before all of your medical problems are known or treated. You, the patient, are responsible to arrange for follow-up care as instructed.

## 2018-12-17 NOTE — PROGRESS NOTES
"Subjective:       Patient ID: Gurjit Xiong is a 23 y.o. female.    Vitals:  height is 5' 7" (1.702 m) and weight is 108.9 kg (240 lb). Her oral temperature is 98.2 °F (36.8 °C). Her blood pressure is 120/81 and her pulse is 89. Her oxygen saturation is 99%.     Chief Complaint: Eye Problem and Sinus Problem    Eye Problem    The right eye is affected. The current episode started in the past 7 days (3 days). The problem occurs constantly. The problem has been gradually worsening. There was no injury mechanism. The pain is moderate. There is no known exposure to pink eye. She does not wear contacts. Associated symptoms include blurred vision, an eye discharge, eye redness, a foreign body sensation and itching. Pertinent negatives include no double vision, fever, nausea, photophobia or vomiting. She has tried nothing for the symptoms.   Sinus Problem   Pertinent negatives include no chills, congestion or headaches.       Constitution: Negative for chills and fever.   HENT: Negative for congestion and sinus pain.    Eyes: Positive for eye discharge, eye itching, eye redness and blurred vision. Negative for eye trauma, foreign body in eye, eye pain, photophobia, vision loss, double vision and eyelid swelling.   Gastrointestinal: Negative for nausea and vomiting.   Genitourinary: Negative for history of kidney stones.   Skin: Negative for rash.   Allergic/Immunologic: Negative for seasonal allergies and itching.   Neurological: Negative for headaches.       Objective:      Physical Exam   Constitutional: She is oriented to person, place, and time. She appears well-developed and well-nourished.   HENT:   Head: Normocephalic and atraumatic.   Right Ear: External ear normal.   Left Ear: External ear normal.   Nose: Nose normal.   Mouth/Throat: Oropharynx is clear and moist.   Eyes: Conjunctivae, EOM and lids are normal. Pupils are equal, round, and reactive to light.       Neck: Trachea normal, full passive range " of motion without pain and phonation normal. Neck supple.   Musculoskeletal: Normal range of motion.   Neurological: She is alert and oriented to person, place, and time.   Skin: Skin is warm, dry and intact.   Psychiatric: She has a normal mood and affect. Her speech is normal and behavior is normal. Judgment and thought content normal. Cognition and memory are normal.   Nursing note and vitals reviewed.      Assessment:       1. Hordeolum externum of left upper eyelid        Plan:         Hordeolum externum of left upper eyelid  -     erythromycin (ROMYCIN) ophthalmic ointment; Place into the left eye every 8 (eight) hours. for 7 days  Dispense: 1 Tube; Refill: 0

## 2018-12-27 ENCOUNTER — OFFICE VISIT (OUTPATIENT)
Dept: URGENT CARE | Facility: CLINIC | Age: 23
End: 2018-12-27
Payer: COMMERCIAL

## 2018-12-27 VITALS
BODY MASS INDEX: 37.67 KG/M2 | RESPIRATION RATE: 16 BRPM | DIASTOLIC BLOOD PRESSURE: 90 MMHG | TEMPERATURE: 98 F | SYSTOLIC BLOOD PRESSURE: 134 MMHG | WEIGHT: 240 LBS | HEART RATE: 97 BPM | HEIGHT: 67 IN | OXYGEN SATURATION: 98 %

## 2018-12-27 DIAGNOSIS — K52.9 GASTROENTERITIS: Primary | ICD-10-CM

## 2018-12-27 DIAGNOSIS — R11.2 NON-INTRACTABLE VOMITING WITH NAUSEA, UNSPECIFIED VOMITING TYPE: ICD-10-CM

## 2018-12-27 PROCEDURE — 99213 OFFICE O/P EST LOW 20 MIN: CPT | Mod: 25,S$GLB,, | Performed by: INTERNAL MEDICINE

## 2018-12-27 PROCEDURE — 96372 THER/PROPH/DIAG INJ SC/IM: CPT | Mod: S$GLB,,, | Performed by: INTERNAL MEDICINE

## 2018-12-27 PROCEDURE — 3008F BODY MASS INDEX DOCD: CPT | Mod: CPTII,S$GLB,, | Performed by: INTERNAL MEDICINE

## 2018-12-27 RX ORDER — NORGESTIMATE AND ETHINYL ESTRADIOL 0.25-0.035
1 KIT ORAL DAILY
COMMUNITY

## 2018-12-27 RX ORDER — ONDANSETRON 4 MG/1
4 TABLET, FILM COATED ORAL DAILY PRN
Qty: 10 TABLET | Refills: 0 | Status: SHIPPED | OUTPATIENT
Start: 2018-12-27

## 2018-12-27 RX ORDER — PROMETHAZINE HYDROCHLORIDE 25 MG/ML
50 INJECTION, SOLUTION INTRAMUSCULAR; INTRAVENOUS
Status: COMPLETED | OUTPATIENT
Start: 2018-12-27 | End: 2018-12-27

## 2018-12-27 RX ORDER — HYDROXYZINE PAMOATE 50 MG/1
50 CAPSULE ORAL EVERY 8 HOURS PRN
Qty: 25 CAPSULE | Refills: 0 | Status: SHIPPED | OUTPATIENT
Start: 2018-12-27 | End: 2019-02-14

## 2018-12-27 RX ADMIN — PROMETHAZINE HYDROCHLORIDE 50 MG: 25 INJECTION, SOLUTION INTRAMUSCULAR; INTRAVENOUS at 08:12

## 2018-12-27 NOTE — LETTER
December 27, 2018      [facility]  [address] [facility] [address]  Phone: [phone]  Fax: [phone]       Patient: Gurjit Xiong   YOB: 1995  Date of Visit: 12/27/2018    To Whom It May Concern:    Cal Xiong  was at [facility] on 12/27/2018. She may return to work/school on 12/29/2018   with no restrictions. If you have any questions or concerns, or if I can be of further assistance, please do not hesitate to contact me.    Sincerely,    Dr Gu

## 2018-12-28 NOTE — PATIENT INSTRUCTIONS
Noninfectious Gastroenteritis (Ages 6 Years to Adult)    Gastroenteritis can cause nausea, vomiting, diarrhea, and abdominal cramping. This may occur as a result of food sensitivity, inflammation of your gastrointestinal tract, medicines, stress, or other causes not related to infection. Your symptoms will usually last from 1 to 3 days, but can last longer. Antibiotics are not effective, but simple home treatment will be helpful.  Home care  Medicine  · You may use acetaminophen or NSAID medicines like ibuprofen or naproxen to control fever, unless another medicine is prescribed. (Note: If you have chronic liver or kidney disease, or ever had a stomach ulcer or gastrointestinalI bleeding, talk with your healthcare provider before using these medicines.) Aspirin should never be used in anyone under 18 years of age who is ill with a fever. It may cause severe liver damage. Don't increase your NSAID medicines if you are already taking these medicines for another condition (like arthritis). Don't use NSAIDS if you are on aspirin (such as for heart disease, or after a stroke).  · If medicines for diarrhea or vomiting are prescribed, take only as directed.  General care and preventing spread of the illness  · If symptoms are severe, rest at home for the next 24 hours or until you feel better.  · Hand washing with soap and water is the best way to prevent the spread of infection. Wash your hands after touching anyone who is sick.  · Wash your hands after using the toilet and before meals. Clean the toilet after each use.  · Caffeine, tobacco, and alcohol can make your diarrhea, cramping, and pain worse.  Diet  · Water and clear liquids are important so you do not get dehydrated. Drink a small amount at a time.  · Do not force yourself to eat, especially if you have cramps, vomiting, or diarrhea. When you finally decide to start eating, do not eat large amounts at a time, even if you are hungry.  · If you eat, avoid  fatty, greasy, spicy, or fried foods.  · Do not eat dairy products if you have diarrhea; they can make the diarrhea worse.  During the first 24 hours (the first full day), follow the diet below:  · Beverages: Water, clear liquids, soft drinks without caffeine, like ginger ale; mineral water (plain or flavored); decaffeinated tea and coffee.  · Soups: Clear broth, consommé, and bouillon Sports drinks aren't a good choice because they have too much sugar and not enough electrolytes. In this case, commercially available products called oral rehydration solutions are best.  · Desserts: Plain gelatin, popsicles, and fruit juice bars.  During the next 24 hours (the second day), you may add the following to the above if you have improved. If not, continue what you did the first day:  · Hot cereal, plain toast, bread, rolls, crackers  · Plain noodles, rice, mashed potatoes, chicken noodle or rice soup  · Unsweetened canned fruit (avoid pineapple), bananas  · Limit caffeine and chocolate. No spices or seasonings except salt.  During the next 24 hours  · Gradually resume a normal diet, as you feel better and your symptoms improve.  · If at any time your symptoms start getting worse, go back to clear liquids until you feel better.  Food preparation  · If you have diarrhea, you should not prepare food for others. When you  prepare food for yourself, wash your hands before and after.  · Wash your hands after using cutting boards, countertops, and knives that have been in contact with raw food.  · Keep uncooked meats away from cooked and ready-to-eat foods.  Follow-up care  Follow up with your healthcare provider if you are not improving over the next 2 to 3 days, or as advised. If a stool (diarrhea) sample was taken, call for the results as directed.  When to seek medical care  Call your healthcare provider right away if any of these occur:   · Increasing abdominal pain or constant lower right abdominal pain  · Continued  vomiting (unable to keep liquids down)  · Frequent diarrhea (more than 5 times a day)  · Blood in vomit or stool (black or red color)  · Inability to tolerate solid food after a few days.  · Dark urine, reduced urine output  · Weakness, dizziness  · Drowsiness  · Fever of 100.4ºF (38.0ºC) or higher, or as directed by your healthcare provider  · New rash  Call 911  Call 911 if any of these occur:  · Trouble breathing  · Chest pain  · Confusion  · Severe drowsiness or trouble awakening  · Seizure  · Stiff neck  Date Last Reviewed: 11/16/2015 © 2000-2017 [employer]. [address] [place] [address], [place] [address]. All rights reserved. This information is not intended as a substitute for professional medical care. Always follow your healthcare professional's instructions.    Please return here or go to the Emergency Department for any concerns or worsening of condition.  If you were prescribed antibiotics, please take them to completion.  If you were prescribed a narcotic medication, do not drive or operate heavy equipment or machinery while taking these medications.  Please follow up with your primary care doctor or specialist as needed.    If you  smoke, please stop smoking.  1) Do not eat any solid foods until nausea, and vomiting have gone away for 16 hours.  2) You do not have to eat solid foods for a month, but you must drink liquids daily.  3) If not eating solid food you must drink liquids that have sugar in it, such as Gatorade and 7up (these two are very good for a sore stomach). Fluids with sugars will keep you from having a hunger headache, these associated with low blood sugars.  4) If no nausea and vomiting for 16 hours you may advance your diet to the well known BRAT diet (bananas, rice/mash potatoes [not spicy], apple sauce, and toast or crackers).  5) If diarrhea is present then do not eat fruits (especally apple sauce).  6) Diarrhea must pass, attempts to slow down the diarrhea can increase  the length of the illness. So, to ensure that your bottom does not get chafed (the reason for this is because diarrhea is an acidic fluid) you must use a barrier cream such as [employer] Butt Paste.  7) You must practice GOOD HAND HYGIENE in order not to spread this to others ESPECIALLY your FAMILY as this can cause a [place] OF [place].

## 2018-12-28 NOTE — PROGRESS NOTES
"Subjective:       Patient ID: Gurjit Xiong is a 23 y.o. female.    Vitals:  height is 5' 7" (1.702 m) and weight is 108.9 kg (240 lb). Her tympanic temperature is 98.3 °F (36.8 °C). Her blood pressure is 134/90 (abnormal) and her pulse is 97. Her respiration is 16 and oxygen saturation is 98%.     Chief Complaint: Emesis    Emesis    This is a new problem. The current episode started today. The problem occurs 5 to 10 times per day. The problem has been gradually worsening. The emesis has an appearance of stomach contents. There has been no fever. Associated symptoms include abdominal pain. Pertinent negatives include no chest pain, chills, diarrhea or fever. Treatments tried: zofran. The treatment provided no relief.       Constitution: Negative for appetite change, chills, sweating and fever.   HENT: Negative for trouble swallowing.    Cardiovascular: Negative for chest pain.   Respiratory: Negative for shortness of breath.    Gastrointestinal: Positive for abdominal pain, nausea and vomiting. Negative for abdominal trauma, abdominal bloating, constipation, diarrhea, dark colored stools and heartburn.   Genitourinary: Negative for dysuria, missed menses and pelvic pain.   Musculoskeletal: Negative for back pain.       Objective:      Physical Exam   Constitutional: She is oriented to person, place, and time. She appears well-developed and well-nourished. She is cooperative.  Non-toxic appearance. She does not appear ill. No distress.   HENT:   Head: Normocephalic and atraumatic.   Right Ear: Hearing, tympanic membrane, external ear and ear canal normal.   Left Ear: Hearing, tympanic membrane, external ear and ear canal normal.   Nose: Nose normal. No mucosal edema, rhinorrhea or nasal deformity. No epistaxis. Right sinus exhibits no maxillary sinus tenderness and no frontal sinus tenderness. Left sinus exhibits no maxillary sinus tenderness and no frontal sinus tenderness.   Mouth/Throat: Uvula is " midline, oropharynx is clear and moist and mucous membranes are normal. No trismus in the jaw. Normal dentition. No uvula swelling. No posterior oropharyngeal erythema.   Eyes: Conjunctivae and lids are normal. No scleral icterus.   Sclera clear bilat   Neck: Trachea normal, full passive range of motion without pain and phonation normal. Neck supple.   Cardiovascular: Normal rate, regular rhythm, normal heart sounds, intact distal pulses and normal pulses.   Pulmonary/Chest: Effort normal and breath sounds normal. No respiratory distress.   Abdominal: Soft. Normal appearance and bowel sounds are normal. She exhibits no distension. There is no hepatosplenomegaly. There is no tenderness. There is no rigidity, no rebound, no guarding, no CVA tenderness, no tenderness at McBurney's point and negative Lange's sign. No hernia.       Musculoskeletal: Normal range of motion. She exhibits no edema or deformity.   Neurological: She is alert and oriented to person, place, and time. She exhibits normal muscle tone. Coordination normal.   Skin: Skin is warm, dry and intact. She is not diaphoretic. No pallor.   Psychiatric: She has a normal mood and affect. Her speech is normal and behavior is normal. Judgment and thought content normal. Cognition and memory are normal.   Nursing note and vitals reviewed.      Assessment:       1. Gastroenteritis    2. Non-intractable vomiting with nausea, unspecified vomiting type        Plan:         Gastroenteritis  -     promethazine injection 50 mg  -     ondansetron (ZOFRAN) 4 MG tablet; Take 1 tablet (4 mg total) by mouth daily as needed for Nausea (adults can take one or two tablets every 8 hours).  Dispense: 10 tablet; Refill: 0  -     hydrOXYzine pamoate (VISTARIL) 50 MG Cap; Take 1 capsule (50 mg total) by mouth every 8 (eight) hours as needed.  Dispense: 25 capsule; Refill: 0    Non-intractable vomiting with nausea, unspecified vomiting type  -     promethazine injection 50 mg  -      ondansetron (ZOFRAN) 4 MG tablet; Take 1 tablet (4 mg total) by mouth daily as needed for Nausea (adults can take one or two tablets every 8 hours).  Dispense: 10 tablet; Refill: 0  -     hydrOXYzine pamoate (VISTARIL) 50 MG Cap; Take 1 capsule (50 mg total) by mouth every 8 (eight) hours as needed.  Dispense: 25 capsule; Refill: 0       take meds

## 2019-01-10 ENCOUNTER — OFFICE VISIT (OUTPATIENT)
Dept: URGENT CARE | Facility: CLINIC | Age: 24
End: 2019-01-10
Payer: COMMERCIAL

## 2019-01-10 VITALS
TEMPERATURE: 98 F | OXYGEN SATURATION: 100 % | BODY MASS INDEX: 37.67 KG/M2 | DIASTOLIC BLOOD PRESSURE: 92 MMHG | HEART RATE: 85 BPM | SYSTOLIC BLOOD PRESSURE: 136 MMHG | HEIGHT: 67 IN | WEIGHT: 240 LBS

## 2019-01-10 DIAGNOSIS — J00 ACUTE NASOPHARYNGITIS: Primary | ICD-10-CM

## 2019-01-10 PROCEDURE — 99214 PR OFFICE/OUTPT VISIT, EST, LEVL IV, 30-39 MIN: ICD-10-PCS | Mod: S$GLB,,, | Performed by: PHYSICIAN ASSISTANT

## 2019-01-10 PROCEDURE — 99214 OFFICE O/P EST MOD 30 MIN: CPT | Mod: S$GLB,,, | Performed by: PHYSICIAN ASSISTANT

## 2019-01-10 PROCEDURE — 3008F BODY MASS INDEX DOCD: CPT | Mod: CPTII,S$GLB,, | Performed by: PHYSICIAN ASSISTANT

## 2019-01-10 PROCEDURE — 3008F PR BODY MASS INDEX (BMI) DOCUMENTED: ICD-10-PCS | Mod: CPTII,S$GLB,, | Performed by: PHYSICIAN ASSISTANT

## 2019-01-10 RX ORDER — PREDNISONE 20 MG/1
20 TABLET ORAL DAILY
Qty: 4 TABLET | Refills: 0 | Status: SHIPPED | OUTPATIENT
Start: 2019-01-10 | End: 2019-01-14

## 2019-01-10 RX ORDER — BROMPHENIRAMINE MALEATE, PSEUDOEPHEDRINE HYDROCHLORIDE, AND DEXTROMETHORPHAN HYDROBROMIDE 2; 30; 10 MG/5ML; MG/5ML; MG/5ML
10 SYRUP ORAL EVERY 6 HOURS PRN
Qty: 120 ML | Refills: 0 | Status: SHIPPED | OUTPATIENT
Start: 2019-01-10 | End: 2019-01-13

## 2019-01-10 NOTE — PATIENT INSTRUCTIONS
You have been diagnosed with a viral illness. Antibiotics will not help your infection to go away any faster.  You immune system must fight this illness.  You will likely have symptoms for 7-10 days as this is how long a typical virus lasts.  Below are a few things that you can do at home to help yourself feel better in the mean time.     1.  For patients above 6 months of age who are not allergic to and are not on anticoagulants, you can alternate Tylenol and Motrin every 4-6 hours for fever above 100.4F and/or pain.  For patients less than 6 months of age, allergic to or intolerant to NSAIDS, have gastritis, gastric ulcers, or history of GI bleeds, are pregnant, or are on anticoagulant therapy, you can take Tylenol every 4 hours as needed for fever above 100.4F and/or pain.     2.  Rest and keep yourself well hydrated.  Drink hot liquids (coffee, water, tea, hot chocolate, or soup) 10-12 times a day for 5-7 days.  Put liquid in a mug and place in microwave for 2.5 - 3 minutes. Pour hot liquid into another mug not used to microwave the liquid (to avoid burning your mouth) then sniff the steam from the cup and sip the heated liquid.    3.  You can use these over the counter medications/remedies to help with your symptoms:     Runny Nose:  Use an antihistamine such as Claritin, Zyrtec or Allegra to help dry you out.     Congestion:  Use pseudoephedrine (behind the counter) for congestion- Pseudoephedrine 30 mg up to 240 mg /day. Warning:  It can raise your blood pressure and give you palpitations.  Coricidin HBP is okay to use if you have high blood pressure.     Use mucinex (guaifenisin) up to 2400mg/day to break up/loosen any mucous. MucinexDM has a cough suppressant that can be used for cough and at night to stop the tickle in the back of your throat.    Use Nasal Saline to mechanically move any post nasal drip from your eustachian tubes or from the back of your throat.    Use Afrin in each nare, for no longer  than 3 days, as it is addictive. It can also dry out your mucous membranes and cause elevated blood pressure.    Use Flonase 1-2 sprays/nostril per day. It is a local acting steroid nasal spray.  If you develop a bloody nose, stop using the medication immediately.    Sore throat:  Use warm, salt water gargles to ease your throat pain- 1/2 tsp salt to 1 cup warm water, gargle as desired.  Chloraseptic sprays and throat lozenges will also help to ease throat pain.     Sometimes Nyquil at night is beneficial to help you get some rest; however, it is sedating and does contain an antihistamine and Tylenol.  Make sure not to double up on these medications.      These things will help you to feel better and will speed your recovery.  If your condition fails to improve in a timely manner, you should receive another evaluation by your Primary Care Provider/Pediatrician to discuss your concerns or return to urgent care for a recheck.  If your condition worsens at any time, you should report immediately to your nearest Emergency Department for further evaluation. **You must understand that you have received Urgent Care treatment only and that you may be released before all of your medical problems are known or treated. You, the patient, are responsible to arrange for follow-up care as instructed.             Viral Upper Respiratory Illness (Adult)  You have a viral upper respiratory illness (URI), which is another term for the common cold. This illness is contagious during the first few days. It is spread through the air by coughing and sneezing. It may also be spread by direct contact (touching the sick person and then touching your own eyes, nose, or mouth). Frequent handwashing will decrease risk of spread. Most viral illnesses go away within 7 to 10 days with rest and simple home remedies. Sometimes the illness may last for several weeks. Antibiotics will not kill a virus, and they are generally not prescribed for this  condition.    Home care  · If symptoms are severe, rest at home for the first 2 to 3 days. When you resume activity, don't let yourself get too tired.  · Avoid being exposed to cigarette smoke (yours or others).  · You may use acetaminophen or ibuprofen to control pain and fever, unless another medicine was prescribed. (Note: If you have chronic liver or kidney disease, have ever had a stomach ulcer or gastrointestinal bleeding, or are taking blood-thinning medicines, talk with your healthcare provider before using these medicines.) Aspirin should never be given to anyone under 18 years of age who is ill with a viral infection or fever. It may cause severe liver or brain damage.  · Your appetite may be poor, so a light diet is fine. Avoid dehydration by drinking 6 to 8 glasses of fluids per day (water, soft drinks, juices, tea, or soup). Extra fluids will help loosen secretions in the nose and lungs.  · Over-the-counter cold medicines will not shorten the length of time youre sick, but they may be helpful for the following symptoms: cough, sore throat, and nasal and sinus congestion. (Note: Do not use decongestants if you have high blood pressure.)  Follow-up care  Follow up with your healthcare provider, or as advised.  When to seek medical advice  Call your healthcare provider right away if any of these occur:  · Cough with lots of colored sputum (mucus)  · Severe headache; face, neck, or ear pain  · Difficulty swallowing due to throat pain  · Fever of 100.4°F (38°C)  Call 911, or get immediate medical care  Call emergency services right away if any of these occur:  · Chest pain, shortness of breath, wheezing, or difficulty breathing  · Coughing up blood  · Inability to swallow due to throat pain  Date Last Reviewed: 9/13/2015  © 3894-5007 [employer]. [address] [place] [address], [place] [address]. All rights reserved. This information is not intended as a substitute for professional medical care.  Always follow your healthcare professional's instructions.

## 2019-01-10 NOTE — PROGRESS NOTES
"Subjective:       Patient ID: Gurjit Xiong is a 24 y.o. female.    Vitals:  height is 5' 7" (1.702 m) and weight is 108.9 kg (240 lb). Her oral temperature is 97.9 °F (36.6 °C). Her blood pressure is 136/92 (abnormal) and her pulse is 85. Her oxygen saturation is 100%.     Chief Complaint: Sinus Problem and Cough    Patient complains of a RN, congestion, PND, sore throat, cough, headache, and sinus pressure/pain for the past 4 days.  Patient denies fever but has had chills.  Patient also denies improvement with OTC meds.  Patient denies any other complaints at this time.         Sinus Problem   This is a new problem. The current episode started in the past 7 days. The problem has been gradually worsening since onset. There has been no fever. Associated symptoms include chills, congestion, coughing, headaches, sinus pressure and a sore throat. Pertinent negatives include no diaphoresis, ear pain or shortness of breath. Past treatments include nothing.   Cough   Associated symptoms include chills, headaches, postnasal drip and a sore throat. Pertinent negatives include no chest pain, ear pain, eye redness, fever, hemoptysis, myalgias, rash, shortness of breath or wheezing.       Constitution: Positive for chills. Negative for sweating, fatigue and fever.   HENT: Positive for congestion, postnasal drip, sinus pressure and sore throat. Negative for ear pain, sinus pain and voice change.    Neck: Negative for painful lymph nodes.   Cardiovascular: Negative for chest pain.   Eyes: Negative for eye redness.   Respiratory: Positive for cough. Negative for chest tightness, sputum production, bloody sputum, COPD, shortness of breath, stridor, wheezing and asthma.    Gastrointestinal: Negative for nausea and vomiting.   Musculoskeletal: Negative for muscle ache.   Skin: Negative for rash.   Allergic/Immunologic: Negative for seasonal allergies and asthma.   Neurological: Positive for headaches. "   Hematologic/Lymphatic: Negative for swollen lymph nodes.       Objective:      Physical Exam   Constitutional: She is oriented to person, place, and time. She appears well-developed and well-nourished. No distress.   HENT:   Head: Normocephalic and atraumatic.   Right Ear: Tympanic membrane, external ear and ear canal normal.   Left Ear: Tympanic membrane, external ear and ear canal normal.   Nose: Mucosal edema and rhinorrhea present. Right sinus exhibits no maxillary sinus tenderness and no frontal sinus tenderness. Left sinus exhibits no maxillary sinus tenderness and no frontal sinus tenderness.   Mouth/Throat: Uvula is midline and mucous membranes are normal. Posterior oropharyngeal erythema present. No tonsillar exudate.   Eyes: Conjunctivae, EOM and lids are normal. Pupils are equal, round, and reactive to light.   Neck: Normal range of motion. Neck supple.   Cardiovascular: Normal rate, regular rhythm and normal heart sounds.   Pulmonary/Chest: Effort normal and breath sounds normal. No respiratory distress.   Abdominal: Soft. Normal appearance and bowel sounds are normal. She exhibits no distension and no mass. There is no tenderness.   Musculoskeletal: Normal range of motion.   Neurological: She is alert and oriented to person, place, and time. She has normal strength. No cranial nerve deficit or sensory deficit.   Skin: Skin is warm. Capillary refill takes less than 2 seconds.   Psychiatric: She has a normal mood and affect. Her speech is normal and behavior is normal. Judgment and thought content normal. Cognition and memory are normal.   Nursing note and vitals reviewed.      Assessment:       1. Acute nasopharyngitis        Plan:         Acute nasopharyngitis  -     brompheniramine-pseudoeph-DM (BROMFED DM) 2-30-10 mg/5 mL Syrp; Take 10 mLs by mouth every 6 (six) hours as needed.  Dispense: 120 mL; Refill: 0  -     predniSONE (DELTASONE) 20 MG tablet; Take 1 tablet (20 mg total) by mouth once daily.  for 4 days  Dispense: 4 tablet; Refill: 0      Patient Instructions   You have been diagnosed with a viral illness. Antibiotics will not help your infection to go away any faster.  You immune system must fight this illness.  You will likely have symptoms for 7-10 days as this is how long a typical virus lasts.  Below are a few things that you can do at home to help yourself feel better in the mean time.     1.  For patients above 6 months of age who are not allergic to and are not on anticoagulants, you can alternate Tylenol and Motrin every 4-6 hours for fever above 100.4F and/or pain.  For patients less than 6 months of age, allergic to or intolerant to NSAIDS, have gastritis, gastric ulcers, or history of GI bleeds, are pregnant, or are on anticoagulant therapy, you can take Tylenol every 4 hours as needed for fever above 100.4F and/or pain.     2.  Rest and keep yourself well hydrated.  Drink hot liquids (coffee, water, tea, hot chocolate, or soup) 10-12 times a day for 5-7 days.  Put liquid in a mug and place in microwave for 2.5 - 3 minutes. Pour hot liquid into another mug not used to microwave the liquid (to avoid burning your mouth) then sniff the steam from the cup and sip the heated liquid.    3.  You can use these over the counter medications/remedies to help with your symptoms:     Runny Nose:  Use an antihistamine such as Claritin, Zyrtec or Allegra to help dry you out.     Congestion:  Use pseudoephedrine (behind the counter) for congestion- Pseudoephedrine 30 mg up to 240 mg /day. Warning:  It can raise your blood pressure and give you palpitations.  Coricidin HBP is okay to use if you have high blood pressure.     Use mucinex (guaifenisin) up to 2400mg/day to break up/loosen any mucous. MucinexDM has a cough suppressant that can be used for cough and at night to stop the tickle in the back of your throat.    Use Nasal Saline to mechanically move any post nasal drip from your eustachian tubes or from  the back of your throat.    Use Afrin in each nare, for no longer than 3 days, as it is addictive. It can also dry out your mucous membranes and cause elevated blood pressure.    Use Flonase 1-2 sprays/nostril per day. It is a local acting steroid nasal spray.  If you develop a bloody nose, stop using the medication immediately.    Sore throat:  Use warm, salt water gargles to ease your throat pain- 1/2 tsp salt to 1 cup warm water, gargle as desired.  Chloraseptic sprays and throat lozenges will also help to ease throat pain.     Sometimes Nyquil at night is beneficial to help you get some rest; however, it is sedating and does contain an antihistamine and Tylenol.  Make sure not to double up on these medications.      These things will help you to feel better and will speed your recovery.  If your condition fails to improve in a timely manner, you should receive another evaluation by your Primary Care Provider/Pediatrician to discuss your concerns or return to urgent care for a recheck.  If your condition worsens at any time, you should report immediately to your nearest Emergency Department for further evaluation. **You must understand that you have received Urgent Care treatment only and that you may be released before all of your medical problems are known or treated. You, the patient, are responsible to arrange for follow-up care as instructed.             Viral Upper Respiratory Illness (Adult)  You have a viral upper respiratory illness (URI), which is another term for the common cold. This illness is contagious during the first few days. It is spread through the air by coughing and sneezing. It may also be spread by direct contact (touching the sick person and then touching your own eyes, nose, or mouth). Frequent handwashing will decrease risk of spread. Most viral illnesses go away within 7 to 10 days with rest and simple home remedies. Sometimes the illness may last for several weeks. Antibiotics will  not kill a virus, and they are generally not prescribed for this condition.    Home care  · If symptoms are severe, rest at home for the first 2 to 3 days. When you resume activity, don't let yourself get too tired.  · Avoid being exposed to cigarette smoke (yours or others).  · You may use acetaminophen or ibuprofen to control pain and fever, unless another medicine was prescribed. (Note: If you have chronic liver or kidney disease, have ever had a stomach ulcer or gastrointestinal bleeding, or are taking blood-thinning medicines, talk with your healthcare provider before using these medicines.) Aspirin should never be given to anyone under 18 years of age who is ill with a viral infection or fever. It may cause severe liver or brain damage.  · Your appetite may be poor, so a light diet is fine. Avoid dehydration by drinking 6 to 8 glasses of fluids per day (water, soft drinks, juices, tea, or soup). Extra fluids will help loosen secretions in the nose and lungs.  · Over-the-counter cold medicines will not shorten the length of time youre sick, but they may be helpful for the following symptoms: cough, sore throat, and nasal and sinus congestion. (Note: Do not use decongestants if you have high blood pressure.)  Follow-up care  Follow up with your healthcare provider, or as advised.  When to seek medical advice  Call your healthcare provider right away if any of these occur:  · Cough with lots of colored sputum (mucus)  · Severe headache; face, neck, or ear pain  · Difficulty swallowing due to throat pain  · Fever of 100.4°F (38°C)  Call 911, or get immediate medical care  Call emergency services right away if any of these occur:  · Chest pain, shortness of breath, wheezing, or difficulty breathing  · Coughing up blood  · Inability to swallow due to throat pain  Date Last Reviewed: 9/13/2015  © 2322-6515 [employer]. [address] [place] [address], [place] [address]. All rights reserved. This information is  not intended as a substitute for professional medical care. Always follow your healthcare professional's instructions.

## 2019-02-01 ENCOUNTER — OFFICE VISIT (OUTPATIENT)
Dept: URGENT CARE | Facility: CLINIC | Age: 24
End: 2019-02-01
Payer: COMMERCIAL

## 2019-02-01 VITALS
HEIGHT: 67 IN | OXYGEN SATURATION: 99 % | BODY MASS INDEX: 37.67 KG/M2 | TEMPERATURE: 98 F | HEART RATE: 106 BPM | RESPIRATION RATE: 20 BRPM | WEIGHT: 240 LBS

## 2019-02-01 DIAGNOSIS — J01.10 ACUTE FRONTAL SINUSITIS, RECURRENCE NOT SPECIFIED: ICD-10-CM

## 2019-02-01 DIAGNOSIS — R05.9 COUGH: ICD-10-CM

## 2019-02-01 DIAGNOSIS — J45.909 ACUTE ASTHMATIC BRONCHITIS: Primary | ICD-10-CM

## 2019-02-01 LAB
CTP QC/QA: YES
FLUAV AG NPH QL: NEGATIVE
FLUBV AG NPH QL: NEGATIVE

## 2019-02-01 PROCEDURE — 87804 POCT INFLUENZA A/B: ICD-10-PCS | Mod: 59,QW,S$GLB, | Performed by: INTERNAL MEDICINE

## 2019-02-01 PROCEDURE — 96372 THER/PROPH/DIAG INJ SC/IM: CPT | Mod: 59,S$GLB,, | Performed by: INTERNAL MEDICINE

## 2019-02-01 PROCEDURE — 94640 PR INHAL RX, AIRWAY OBST/DX SPUTUM INDUCT: ICD-10-PCS | Mod: S$GLB,,, | Performed by: INTERNAL MEDICINE

## 2019-02-01 PROCEDURE — 96372 PR INJECTION,THERAP/PROPH/DIAG2ST, IM OR SUBCUT: ICD-10-PCS | Mod: 59,S$GLB,, | Performed by: INTERNAL MEDICINE

## 2019-02-01 PROCEDURE — 87804 INFLUENZA ASSAY W/OPTIC: CPT | Mod: 59,QW,S$GLB, | Performed by: INTERNAL MEDICINE

## 2019-02-01 PROCEDURE — 3008F PR BODY MASS INDEX (BMI) DOCUMENTED: ICD-10-PCS | Mod: CPTII,S$GLB,, | Performed by: INTERNAL MEDICINE

## 2019-02-01 PROCEDURE — 94640 AIRWAY INHALATION TREATMENT: CPT | Mod: S$GLB,,, | Performed by: INTERNAL MEDICINE

## 2019-02-01 PROCEDURE — 99213 PR OFFICE/OUTPT VISIT, EST, LEVL III, 20-29 MIN: ICD-10-PCS | Mod: 25,S$GLB,, | Performed by: INTERNAL MEDICINE

## 2019-02-01 PROCEDURE — 3008F BODY MASS INDEX DOCD: CPT | Mod: CPTII,S$GLB,, | Performed by: INTERNAL MEDICINE

## 2019-02-01 PROCEDURE — 99213 OFFICE O/P EST LOW 20 MIN: CPT | Mod: 25,S$GLB,, | Performed by: INTERNAL MEDICINE

## 2019-02-01 RX ORDER — ALBUTEROL SULFATE 0.83 MG/ML
2.5 SOLUTION RESPIRATORY (INHALATION)
Status: COMPLETED | OUTPATIENT
Start: 2019-02-01 | End: 2019-02-01

## 2019-02-01 RX ORDER — IPRATROPIUM BROMIDE 0.5 MG/2.5ML
500 SOLUTION RESPIRATORY (INHALATION) 4 TIMES DAILY
COMMUNITY

## 2019-02-01 RX ORDER — AMOXICILLIN AND CLAVULANATE POTASSIUM 875; 125 MG/1; MG/1
1 TABLET, FILM COATED ORAL 2 TIMES DAILY
Qty: 20 TABLET | Refills: 0 | Status: SHIPPED | OUTPATIENT
Start: 2019-02-01 | End: 2019-02-11

## 2019-02-01 RX ORDER — IPRATROPIUM BROMIDE 0.5 MG/2.5ML
0.5 SOLUTION RESPIRATORY (INHALATION)
Status: COMPLETED | OUTPATIENT
Start: 2019-02-01 | End: 2019-02-01

## 2019-02-01 RX ORDER — DEXAMETHASONE SODIUM PHOSPHATE 100 MG/10ML
10 INJECTION INTRAMUSCULAR; INTRAVENOUS
Status: COMPLETED | OUTPATIENT
Start: 2019-02-01 | End: 2019-02-01

## 2019-02-01 RX ORDER — ALBUTEROL SULFATE 1.25 MG/3ML
1.25 SOLUTION RESPIRATORY (INHALATION) EVERY 6 HOURS PRN
COMMUNITY

## 2019-02-01 RX ORDER — PREDNISONE 20 MG/1
20 TABLET ORAL DAILY
Qty: 5 TABLET | Refills: 0 | Status: SHIPPED | OUTPATIENT
Start: 2019-02-01 | End: 2019-02-06

## 2019-02-01 RX ADMIN — ALBUTEROL SULFATE 2.5 MG: 0.83 SOLUTION RESPIRATORY (INHALATION) at 05:02

## 2019-02-01 RX ADMIN — DEXAMETHASONE SODIUM PHOSPHATE 10 MG: 100 INJECTION INTRAMUSCULAR; INTRAVENOUS at 05:02

## 2019-02-01 RX ADMIN — IPRATROPIUM BROMIDE 0.5 MG: 0.5 SOLUTION RESPIRATORY (INHALATION) at 05:02

## 2019-02-01 NOTE — PATIENT INSTRUCTIONS
Bronchitis with Wheezing (Viral or Bacterial: Adult)    Bronchitis is an infection of the air passages. It often occurs during a cold and is usually caused by a virus. Symptoms include cough with mucus (phlegm) and low-grade fever. This illness is contagious during the first few days and is spread through the air by coughing and sneezing, or by direct contact (touching the sick person and then touching your own eyes, nose, or mouth).  If there is a lot of inflammation, air flow is restricted. The air passages may also go into spasm, especially if you have asthma. This causes wheezing and difficulty breathing even in people who do not have asthma.  Bronchitis usually lasts 7 to 14 days. The wheezing should improve with treatment during the first week. An inhaler is often prescribed to relax the air passages and stop wheezing. Antibiotics will be prescribed if your doctor thinks there is also a secondary bacterial infection.  Home care  · If symptoms are severe, rest at home for the first 2 to 3 days. When you go back to your usual activities, don't let yourself get too tired.  · Do not smoke. Also avoid being exposed to secondhand smoke.  · You may use over-the-counter medicine to control fever or pain, unless another medicine was prescribed. Note: If you have chronic liver or kidney disease or have ever had a stomach ulcer or gastrointestinal bleeding, talk with your healthcare provider before using these medicines. Also talk to your provider if you are taking medicine to prevent blood clots.) Aspirin should never be given to anyone younger than 18 years of age who is ill with a viral infection or fever. It may cause severe liver or brain damage.  · Your appetite may be poor, so a light diet is fine. Avoid dehydration by drinking 6 to 8 glasses of fluids per day (such as water, soft drinks, sports drinks, juices, tea, or soup). Extra fluids will help loosen secretions in the nose and lungs.  · Over-the-counter  cough, cold, and sore-throat medicines will not shorten the length of the illness, but they may be helpful to reduce symptoms. (Note: Do not use decongestants if you have high blood pressure.)  · If you were given an inhaler, use it exactly as directed. If you need to use it more often than prescribed, your condition may be worsening. If this happens, contact your healthcare provider.  · If prescribed, finish all antibiotic medicine, even if you are feeling better after only a few days.  Follow-up care  Follow up with your healthcare provider, or as advised. If you had an X-ray or ECG (electrocardiogram), a specialist will review it. You will be notified of any new findings that may affect your care.  Note: If you are age 65 or older, or if you have a chronic lung disease or condition that affects your immune system, or you smoke, talk to your healthcare provider about having a pneumococcal vaccinations and a yearly influenza vaccination (flu shot).  When to seek medical advice  Call your healthcare provider right away if any of these occur:  · Fever of 100.4°F (38°C) or higher  · Coughing up increasing amounts of colored sputum  · Weakness, drowsiness, headache, facial pain, ear pain, or a stiff neck  Call 911, or get immediate medical care  Contact emergency services right away if any of these occur.  · Coughing up blood  · Worsening weakness, drowsiness, headache, or stiff neck  · Increased wheezing not helped with medication, shortness of breath, or pain with breathing  Date Last Reviewed: 9/13/2015  © 6853-3324 [employer]. [address] [place] [address], [place] [address]. All rights reserved. This information is not intended as a substitute for professional medical care. Always follow your healthcare professional's instructions.    Please return here or go to the Emergency Department for any concerns or worsening of condition.  If you were prescribed antibiotics, please take them to completion.  If you  were prescribed a narcotic medication, do not drive or operate heavy equipment or machinery while taking these medications.  Please follow up with your primary care doctor or specialist as needed.    If you  smoke, please stop smoking.    1) Motrin/advil/ibuprofen- Take Two to Three Tablets(200 mg) three Times a Day for 5 to 7 Days.  2) Mucinex D 1/2 to 1 Tablet twice a day for 5 to 7 Days.  3) Drink Hot Liquids(coffee,WATER,Tea,Hot Chocolate,or Soup) that you put in a Mug place in Microwave for 2.5 to 3 minutes CHANGE THE CUP THAT WAS USED IN THE MICROWAVE SO AS NOT TO BURN YOUR MOUTH,then sniff the steam from the cup and sip the heated liquid TEN TO TWELVE TIMES A DAY for 5 to 7 Days.  4) These 3 things will help the antibiotics and other medications work faster and will speed your recovery!

## 2019-02-01 NOTE — PROGRESS NOTES
"Subjective:       Patient ID: Gurjit Xiong is a 24 y.o. female.    Vitals:  height is 5' 7" (1.702 m) and weight is 108.9 kg (240 lb). Her oral temperature is 98.4 °F (36.9 °C). Her pulse is 106. Her respiration is 20 and oxygen saturation is 99%.     Chief Complaint: Wheezing and Cough    Wheezing    This is a recurrent problem. The current episode started in the past 7 days. The problem has been gradually worsening. Associated symptoms include coughing. Pertinent negatives include no chills, ear pain, fever, hemoptysis, rash, shortness of breath, sore throat, sputum production or vomiting. Nothing aggravates the symptoms. She has tried OTC cough suppressant for the symptoms. The treatment provided no relief. Her past medical history is significant for asthma.       Constitution: Negative for chills, sweating, fatigue and fever.   HENT: Negative for ear pain, congestion, sinus pain, sinus pressure, sore throat and voice change.    Neck: Negative for painful lymph nodes.   Eyes: Negative for eye redness.   Respiratory: Positive for cough, wheezing and asthma. Negative for chest tightness, sputum production, bloody sputum, COPD, shortness of breath and stridor.    Gastrointestinal: Negative for nausea and vomiting.   Musculoskeletal: Negative for muscle ache.   Skin: Negative for rash.   Allergic/Immunologic: Positive for asthma. Negative for seasonal allergies.   Hematologic/Lymphatic: Negative for swollen lymph nodes.       Objective:      Physical Exam   Constitutional: She is oriented to person, place, and time. She appears well-developed and well-nourished. She is cooperative.  Non-toxic appearance. She does not appear ill. No distress.   HENT:   Head: Normocephalic and atraumatic.   Right Ear: Hearing, tympanic membrane, external ear and ear canal normal.   Left Ear: Hearing, tympanic membrane, external ear and ear canal normal.   Nose: Nose normal. No mucosal edema, rhinorrhea or nasal deformity. No " epistaxis. Right sinus exhibits no maxillary sinus tenderness and no frontal sinus tenderness. Left sinus exhibits no maxillary sinus tenderness and no frontal sinus tenderness.   Mouth/Throat: Uvula is midline, oropharynx is clear and moist and mucous membranes are normal. No trismus in the jaw. Normal dentition. No uvula swelling. No posterior oropharyngeal erythema.   Eyes: Conjunctivae and lids are normal. No scleral icterus.   Sclera clear bilat   Neck: Trachea normal, full passive range of motion without pain and phonation normal. Neck supple.   Cardiovascular: Normal rate, regular rhythm, normal heart sounds, intact distal pulses and normal pulses.   Pulmonary/Chest: Accessory muscle usage present. Tachypneic: mild  She is in respiratory distress (after neb course BS barely wheezing ). She has decreased breath sounds in the right middle field, the right lower field, the left middle field and the left lower field. She has wheezes in the right upper field, the right middle field, the right lower field, the left upper field, the left middle field and the left lower field. She has rhonchi in the right middle field, the right lower field, the left middle field and the left lower field.   Abdominal: Soft. Normal appearance and bowel sounds are normal. She exhibits no distension. There is no tenderness.   Musculoskeletal: Normal range of motion. She exhibits no edema or deformity.   Neurological: She is alert and oriented to person, place, and time. She exhibits normal muscle tone. Coordination normal.   Skin: Skin is warm, dry and intact. She is not diaphoretic. No pallor.   Psychiatric: She has a normal mood and affect. Her speech is normal and behavior is normal. Judgment and thought content normal. Cognition and memory are normal.   Nursing note and vitals reviewed.      Assessment:       1. Acute asthmatic bronchitis    2. Acute frontal sinusitis, recurrence not specified    3. Cough        Plan:         Acute  asthmatic bronchitis  -     POCT Influenza A/B  -     predniSONE (DELTASONE) 20 MG tablet; Take 1 tablet (20 mg total) by mouth once daily. for 5 days  Dispense: 5 tablet; Refill: 0  -     amoxicillin-clavulanate 875-125mg (AUGMENTIN) 875-125 mg per tablet; Take 1 tablet by mouth 2 (two) times daily. for 10 days  Dispense: 20 tablet; Refill: 0  -     dexamethasone injection 10 mg  -     ipratropium 0.02 % nebulizer solution 0.5 mg  -     albuterol nebulizer solution 2.5 mg    Acute frontal sinusitis, recurrence not specified  -     predniSONE (DELTASONE) 20 MG tablet; Take 1 tablet (20 mg total) by mouth once daily. for 5 days  Dispense: 5 tablet; Refill: 0  -     amoxicillin-clavulanate 875-125mg (AUGMENTIN) 875-125 mg per tablet; Take 1 tablet by mouth 2 (two) times daily. for 10 days  Dispense: 20 tablet; Refill: 0  -     dexamethasone injection 10 mg  -     ipratropium 0.02 % nebulizer solution 0.5 mg  -     albuterol nebulizer solution 2.5 mg    Cough       take meds

## 2019-02-03 ENCOUNTER — TELEPHONE (OUTPATIENT)
Dept: URGENT CARE | Facility: CLINIC | Age: 24
End: 2019-02-03

## 2019-02-04 ENCOUNTER — HOSPITAL ENCOUNTER (OUTPATIENT)
Facility: HOSPITAL | Age: 24
Discharge: HOME | End: 2019-02-06
Attending: SURGERY | Admitting: INTERNAL MEDICINE
Payer: COMMERCIAL

## 2019-02-04 DIAGNOSIS — R06.02 SOB (SHORTNESS OF BREATH): ICD-10-CM

## 2019-02-04 DIAGNOSIS — J45.902 MODERATE ASTHMA WITH STATUS ASTHMATICUS, UNSPECIFIED WHETHER PERSISTENT: Primary | ICD-10-CM

## 2019-02-04 DIAGNOSIS — J45.21 MILD INTERMITTENT ASTHMA WITH ACUTE EXACERBATION: ICD-10-CM

## 2019-02-04 PROBLEM — R79.89 ELEVATED TSH: Status: ACTIVE | Noted: 2019-02-04

## 2019-02-04 PROBLEM — R79.89 ELEVATED LACTIC ACID LEVEL: Status: ACTIVE | Noted: 2019-02-04

## 2019-02-04 PROBLEM — R06.09 DOE (DYSPNEA ON EXERTION): Status: ACTIVE | Noted: 2019-02-04

## 2019-02-04 PROBLEM — E87.6 HYPOKALEMIA: Status: ACTIVE | Noted: 2019-02-04

## 2019-02-04 PROBLEM — E83.39 HYPOPHOSPHATEMIA: Status: ACTIVE | Noted: 2019-02-04

## 2019-02-04 LAB
ALBUMIN SERPL BCP-MCNC: 3.8 G/DL
ALP SERPL-CCNC: 73 U/L
ALT SERPL W/O P-5'-P-CCNC: 15 U/L
AMPHET+METHAMPHET UR QL: NEGATIVE
ANION GAP SERPL CALC-SCNC: 11 MMOL/L
APTT BLDCRRT: 28.2 SEC
AST SERPL-CCNC: 11 U/L
B-HCG UR QL: NEGATIVE
BACTERIA #/AREA URNS HPF: ABNORMAL /HPF
BARBITURATES UR QL SCN>200 NG/ML: NEGATIVE
BASOPHILS # BLD AUTO: 0.09 K/UL
BASOPHILS NFR BLD: 0.6 %
BENZODIAZ UR QL SCN>200 NG/ML: NEGATIVE
BILIRUB SERPL-MCNC: 0.3 MG/DL
BILIRUB UR QL STRIP: NEGATIVE
BNP SERPL-MCNC: 12 PG/ML
BUN SERPL-MCNC: 13 MG/DL
BZE UR QL SCN: NEGATIVE
CALCIUM SERPL-MCNC: 9.6 MG/DL
CANNABINOIDS UR QL SCN: NEGATIVE
CHLORIDE SERPL-SCNC: 107 MMOL/L
CK MB SERPL-MCNC: 0.7 NG/ML
CK MB SERPL-RTO: 1.2 %
CK SERPL-CCNC: 60 U/L
CK SERPL-CCNC: 60 U/L
CLARITY UR: CLEAR
CO2 SERPL-SCNC: 20 MMOL/L
COLOR UR: YELLOW
CREAT SERPL-MCNC: 0.8 MG/DL
CREAT UR-MCNC: 79.6 MG/DL
D DIMER PPP IA.FEU-MCNC: <0.19 MG/L FEU
DEPRECATED S PYO AG THROAT QL EIA: NEGATIVE
DIFFERENTIAL METHOD: ABNORMAL
EOSINOPHIL # BLD AUTO: 0.1 K/UL
EOSINOPHIL NFR BLD: 0.6 %
ERYTHROCYTE [DISTWIDTH] IN BLOOD BY AUTOMATED COUNT: 13.5 %
EST. GFR  (AFRICAN AMERICAN): >60 ML/MIN/1.73 M^2
EST. GFR  (NON AFRICAN AMERICAN): >60 ML/MIN/1.73 M^2
GLUCOSE SERPL-MCNC: 86 MG/DL
GLUCOSE UR QL STRIP: NEGATIVE
HCT VFR BLD AUTO: 40.1 %
HGB BLD-MCNC: 13.1 G/DL
HGB UR QL STRIP: NEGATIVE
INFLUENZA A, MOLECULAR: NEGATIVE
INFLUENZA B, MOLECULAR: NEGATIVE
INR PPP: 0.9
KETONES UR QL STRIP: NEGATIVE
LACTATE SERPL-SCNC: 2.2 MMOL/L
LACTATE SERPL-SCNC: 3.4 MMOL/L
LACTATE SERPL-SCNC: 3.9 MMOL/L
LACTATE SERPL-SCNC: 4.1 MMOL/L
LEUKOCYTE ESTERASE UR QL STRIP: ABNORMAL
LYMPHOCYTES # BLD AUTO: 3.2 K/UL
LYMPHOCYTES NFR BLD: 20.6 %
MAGNESIUM SERPL-MCNC: 2.1 MG/DL
MCH RBC QN AUTO: 28.5 PG
MCHC RBC AUTO-ENTMCNC: 32.7 G/DL
MCV RBC AUTO: 87 FL
METHADONE UR QL SCN>300 NG/ML: NEGATIVE
MICROSCOPIC COMMENT: ABNORMAL
MONOCYTES # BLD AUTO: 1.1 K/UL
MONOCYTES NFR BLD: 7 %
NEUTROPHILS # BLD AUTO: 11 K/UL
NEUTROPHILS NFR BLD: 71.2 %
NITRITE UR QL STRIP: NEGATIVE
OPIATES UR QL SCN: NEGATIVE
PCP UR QL SCN>25 NG/ML: NEGATIVE
PH UR STRIP: 8 [PH] (ref 5–8)
PHOSPHATE SERPL-MCNC: 2 MG/DL
PLATELET # BLD AUTO: 258 K/UL
PMV BLD AUTO: 12 FL
POTASSIUM SERPL-SCNC: 3.4 MMOL/L
PROT SERPL-MCNC: 7.2 G/DL
PROT UR QL STRIP: NEGATIVE
PROTHROMBIN TIME: 9.9 SEC
RBC # BLD AUTO: 4.6 M/UL
RBC #/AREA URNS HPF: 4 /HPF (ref 0–4)
SODIUM SERPL-SCNC: 138 MMOL/L
SP GR UR STRIP: 1.01 (ref 1–1.03)
SPECIMEN SOURCE: NORMAL
SQUAMOUS #/AREA URNS HPF: 15 /HPF
T4 FREE SERPL-MCNC: 0.85 NG/DL
TOXICOLOGY INFORMATION: NORMAL
TROPONIN I SERPL DL<=0.01 NG/ML-MCNC: <0.006 NG/ML
TSH SERPL DL<=0.005 MIU/L-ACNC: 6.85 UIU/ML
URN SPEC COLLECT METH UR: ABNORMAL
UROBILINOGEN UR STRIP-ACNC: NEGATIVE EU/DL
WBC # BLD AUTO: 15.47 K/UL
WBC #/AREA URNS HPF: 10 /HPF (ref 0–5)

## 2019-02-04 PROCEDURE — 87081 CULTURE SCREEN ONLY: CPT

## 2019-02-04 PROCEDURE — 63600175 PHARM REV CODE 636 W HCPCS: Performed by: NURSE PRACTITIONER

## 2019-02-04 PROCEDURE — 83880 ASSAY OF NATRIURETIC PEPTIDE: CPT

## 2019-02-04 PROCEDURE — 96376 TX/PRO/DX INJ SAME DRUG ADON: CPT

## 2019-02-04 PROCEDURE — 84484 ASSAY OF TROPONIN QUANT: CPT

## 2019-02-04 PROCEDURE — 25000003 PHARM REV CODE 250: Performed by: NURSE PRACTITIONER

## 2019-02-04 PROCEDURE — 85379 FIBRIN DEGRADATION QUANT: CPT

## 2019-02-04 PROCEDURE — 96366 THER/PROPH/DIAG IV INF ADDON: CPT

## 2019-02-04 PROCEDURE — 85025 COMPLETE CBC W/AUTO DIFF WBC: CPT

## 2019-02-04 PROCEDURE — 87040 BLOOD CULTURE FOR BACTERIA: CPT

## 2019-02-04 PROCEDURE — 84439 ASSAY OF FREE THYROXINE: CPT

## 2019-02-04 PROCEDURE — 63600175 PHARM REV CODE 636 W HCPCS: Performed by: INTERNAL MEDICINE

## 2019-02-04 PROCEDURE — 99223 PR INITIAL HOSPITAL CARE,LEVL III: ICD-10-PCS | Mod: ,,, | Performed by: INTERNAL MEDICINE

## 2019-02-04 PROCEDURE — 25000003 PHARM REV CODE 250: Performed by: INTERNAL MEDICINE

## 2019-02-04 PROCEDURE — 99223 1ST HOSP IP/OBS HIGH 75: CPT | Mod: ,,, | Performed by: INTERNAL MEDICINE

## 2019-02-04 PROCEDURE — 63600175 PHARM REV CODE 636 W HCPCS: Performed by: SURGERY

## 2019-02-04 PROCEDURE — 25500020 PHARM REV CODE 255: Performed by: INTERNAL MEDICINE

## 2019-02-04 PROCEDURE — 84443 ASSAY THYROID STIM HORMONE: CPT

## 2019-02-04 PROCEDURE — 93010 EKG 12-LEAD: ICD-10-PCS | Mod: ,,, | Performed by: INTERNAL MEDICINE

## 2019-02-04 PROCEDURE — 25000242 PHARM REV CODE 250 ALT 637 W/ HCPCS: Performed by: SURGERY

## 2019-02-04 PROCEDURE — G0378 HOSPITAL OBSERVATION PER HR: HCPCS

## 2019-02-04 PROCEDURE — 93010 ELECTROCARDIOGRAM REPORT: CPT | Mod: ,,, | Performed by: INTERNAL MEDICINE

## 2019-02-04 PROCEDURE — 83970 ASSAY OF PARATHORMONE: CPT

## 2019-02-04 PROCEDURE — 83605 ASSAY OF LACTIC ACID: CPT

## 2019-02-04 PROCEDURE — 81000 URINALYSIS NONAUTO W/SCOPE: CPT | Mod: 59

## 2019-02-04 PROCEDURE — 87798 DETECT AGENT NOS DNA AMP: CPT

## 2019-02-04 PROCEDURE — 87502 INFLUENZA DNA AMP PROBE: CPT

## 2019-02-04 PROCEDURE — 85730 THROMBOPLASTIN TIME PARTIAL: CPT

## 2019-02-04 PROCEDURE — 25000003 PHARM REV CODE 250: Performed by: SURGERY

## 2019-02-04 PROCEDURE — 85610 PROTHROMBIN TIME: CPT

## 2019-02-04 PROCEDURE — 96365 THER/PROPH/DIAG IV INF INIT: CPT

## 2019-02-04 PROCEDURE — 87880 STREP A ASSAY W/OPTIC: CPT

## 2019-02-04 PROCEDURE — 36415 COLL VENOUS BLD VENIPUNCTURE: CPT

## 2019-02-04 PROCEDURE — 96374 THER/PROPH/DIAG INJ IV PUSH: CPT

## 2019-02-04 PROCEDURE — 96367 TX/PROPH/DG ADDL SEQ IV INF: CPT

## 2019-02-04 PROCEDURE — 80053 COMPREHEN METABOLIC PANEL: CPT

## 2019-02-04 PROCEDURE — 94640 AIRWAY INHALATION TREATMENT: CPT

## 2019-02-04 PROCEDURE — 84100 ASSAY OF PHOSPHORUS: CPT

## 2019-02-04 PROCEDURE — 82550 ASSAY OF CK (CPK): CPT

## 2019-02-04 PROCEDURE — 99900035 HC TECH TIME PER 15 MIN (STAT)

## 2019-02-04 PROCEDURE — 93005 ELECTROCARDIOGRAM TRACING: CPT

## 2019-02-04 PROCEDURE — 99285 EMERGENCY DEPT VISIT HI MDM: CPT | Mod: 25

## 2019-02-04 PROCEDURE — 83735 ASSAY OF MAGNESIUM: CPT

## 2019-02-04 PROCEDURE — 82553 CREATINE MB FRACTION: CPT

## 2019-02-04 PROCEDURE — 80307 DRUG TEST PRSMV CHEM ANLYZR: CPT

## 2019-02-04 PROCEDURE — 94761 N-INVAS EAR/PLS OXIMETRY MLT: CPT

## 2019-02-04 PROCEDURE — 81025 URINE PREGNANCY TEST: CPT

## 2019-02-04 PROCEDURE — 83605 ASSAY OF LACTIC ACID: CPT | Mod: 91

## 2019-02-04 PROCEDURE — 96361 HYDRATE IV INFUSION ADD-ON: CPT

## 2019-02-04 RX ORDER — SODIUM CHLORIDE 0.9 % (FLUSH) 0.9 %
5 SYRINGE (ML) INJECTION
Status: DISCONTINUED | OUTPATIENT
Start: 2019-02-04 | End: 2019-02-06 | Stop reason: HOSPADM

## 2019-02-04 RX ORDER — PANTOPRAZOLE SODIUM 40 MG/1
40 TABLET, DELAYED RELEASE ORAL DAILY
Status: DISCONTINUED | OUTPATIENT
Start: 2019-02-04 | End: 2019-02-06 | Stop reason: HOSPADM

## 2019-02-04 RX ORDER — METHYLPREDNISOLONE SOD SUCC 125 MG
80 VIAL (EA) INJECTION EVERY 8 HOURS
Status: DISCONTINUED | OUTPATIENT
Start: 2019-02-04 | End: 2019-02-05

## 2019-02-04 RX ORDER — POTASSIUM CHLORIDE 20 MEQ/1
20 TABLET, EXTENDED RELEASE ORAL ONCE
Status: COMPLETED | OUTPATIENT
Start: 2019-02-04 | End: 2019-02-04

## 2019-02-04 RX ORDER — IPRATROPIUM BROMIDE AND ALBUTEROL SULFATE 2.5; .5 MG/3ML; MG/3ML
3 SOLUTION RESPIRATORY (INHALATION) EVERY 4 HOURS
Status: DISCONTINUED | OUTPATIENT
Start: 2019-02-04 | End: 2019-02-06 | Stop reason: HOSPADM

## 2019-02-04 RX ORDER — PROMETHAZINE HYDROCHLORIDE AND CODEINE PHOSPHATE 6.25; 1 MG/5ML; MG/5ML
5 SOLUTION ORAL EVERY 4 HOURS PRN
Status: DISCONTINUED | OUTPATIENT
Start: 2019-02-04 | End: 2019-02-06 | Stop reason: HOSPADM

## 2019-02-04 RX ORDER — ONDANSETRON 2 MG/ML
4 INJECTION INTRAMUSCULAR; INTRAVENOUS EVERY 8 HOURS PRN
Status: DISCONTINUED | OUTPATIENT
Start: 2019-02-04 | End: 2019-02-06 | Stop reason: HOSPADM

## 2019-02-04 RX ORDER — SODIUM,POTASSIUM PHOSPHATES 280-250MG
1 POWDER IN PACKET (EA) ORAL ONCE
Status: DISCONTINUED | OUTPATIENT
Start: 2019-02-04 | End: 2019-02-04

## 2019-02-04 RX ORDER — IPRATROPIUM BROMIDE AND ALBUTEROL SULFATE 2.5; .5 MG/3ML; MG/3ML
3 SOLUTION RESPIRATORY (INHALATION)
Status: COMPLETED | OUTPATIENT
Start: 2019-02-04 | End: 2019-02-04

## 2019-02-04 RX ORDER — SODIUM CHLORIDE 9 MG/ML
INJECTION, SOLUTION INTRAVENOUS CONTINUOUS
Status: DISCONTINUED | OUTPATIENT
Start: 2019-02-04 | End: 2019-02-06 | Stop reason: HOSPADM

## 2019-02-04 RX ORDER — METHYLPREDNISOLONE SOD SUCC 125 MG
125 VIAL (EA) INJECTION EVERY 8 HOURS
Status: DISCONTINUED | OUTPATIENT
Start: 2019-02-04 | End: 2019-02-04

## 2019-02-04 RX ORDER — ACETAMINOPHEN 325 MG/1
650 TABLET ORAL EVERY 8 HOURS PRN
Status: DISCONTINUED | OUTPATIENT
Start: 2019-02-04 | End: 2019-02-06 | Stop reason: HOSPADM

## 2019-02-04 RX ADMIN — PROMETHAZINE HYDROCHLORIDE AND CODEINE PHOSPHATE 5 ML: 10; 6.25 SOLUTION ORAL at 09:02

## 2019-02-04 RX ADMIN — IPRATROPIUM BROMIDE AND ALBUTEROL SULFATE 3 ML: .5; 3 SOLUTION RESPIRATORY (INHALATION) at 11:02

## 2019-02-04 RX ADMIN — SODIUM CHLORIDE: 0.9 INJECTION, SOLUTION INTRAVENOUS at 10:02

## 2019-02-04 RX ADMIN — IOHEXOL 100 ML: 350 INJECTION, SOLUTION INTRAVENOUS at 09:02

## 2019-02-04 RX ADMIN — AZITHROMYCIN MONOHYDRATE 500 MG: 500 INJECTION, POWDER, LYOPHILIZED, FOR SOLUTION INTRAVENOUS at 05:02

## 2019-02-04 RX ADMIN — PANTOPRAZOLE SODIUM 40 MG: 40 TABLET, DELAYED RELEASE ORAL at 01:02

## 2019-02-04 RX ADMIN — SODIUM CHLORIDE: 0.9 INJECTION, SOLUTION INTRAVENOUS at 02:02

## 2019-02-04 RX ADMIN — IPRATROPIUM BROMIDE AND ALBUTEROL SULFATE 3 ML: .5; 3 SOLUTION RESPIRATORY (INHALATION) at 01:02

## 2019-02-04 RX ADMIN — IPRATROPIUM BROMIDE AND ALBUTEROL SULFATE 3 ML: .5; 3 SOLUTION RESPIRATORY (INHALATION) at 04:02

## 2019-02-04 RX ADMIN — METHYLPREDNISOLONE SODIUM SUCCINATE 125 MG: 125 INJECTION, POWDER, FOR SOLUTION INTRAMUSCULAR; INTRAVENOUS at 11:02

## 2019-02-04 RX ADMIN — POTASSIUM CHLORIDE 20 MEQ: 1500 TABLET, EXTENDED RELEASE ORAL at 01:02

## 2019-02-04 RX ADMIN — IPRATROPIUM BROMIDE AND ALBUTEROL SULFATE 3 ML: .5; 3 SOLUTION RESPIRATORY (INHALATION) at 08:02

## 2019-02-04 RX ADMIN — POTASSIUM PHOSPHATE, MONOBASIC AND POTASSIUM PHOSPHATE, DIBASIC 8 MMOL: 224; 236 INJECTION, SOLUTION, CONCENTRATE INTRAVENOUS at 10:02

## 2019-02-04 RX ADMIN — IPRATROPIUM BROMIDE AND ALBUTEROL SULFATE 3 ML: .5; 3 SOLUTION RESPIRATORY (INHALATION) at 10:02

## 2019-02-04 RX ADMIN — CEFTRIAXONE 1 G: 1 INJECTION, SOLUTION INTRAVENOUS at 05:02

## 2019-02-04 RX ADMIN — METHYLPREDNISOLONE SODIUM SUCCINATE 80 MG: 125 INJECTION, POWDER, FOR SOLUTION INTRAMUSCULAR; INTRAVENOUS at 09:02

## 2019-02-04 NOTE — ASSESSMENT & PLAN NOTE
Wheezing since Friday unable to relieve cough and wheezing . 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

## 2019-02-04 NOTE — HPI
25 yo female patient with PMHX of asthma and PCOS. She presented to ER with c/o SOB. She was seen by Dr Gu last week abd given IM and po steroids. She was also using nebuilizer at home without relief. Coughing up clear sputum. SOB and CLIFTON. Went to Dr Oneil office today as she was not getting better and he sent her to ER. She is afebrile and has slight elevated WBC but has been on steroids oupt. CXR clear. She was placed in observation for steroids, nebs and O2 support. Still very SOB with minimal exertion.

## 2019-02-04 NOTE — ED PROVIDER NOTES
Encounter Date: 2/4/2019       History     Chief Complaint   Patient presents with    Shortness of Breath     c/o asthma since Wed of last week, saw Dr. Gu on Friday, given meds, still feeling bad this am. She went to see Dr. Gu this am and he sent her here.     Gurjit Hernandez is a 24 y.o. Female who presents to the ED with reports of exacerbation of asthma. Increased wheezing at home with cough and SOB not relieved by rescue nebulization. Reports was seen @ urgent care on Wed (01/30).; given IM and oral steroids and symptoms have only worsened. Productive cough with clear sputum; cough occurs throughout the day and night with I/E wheezing. Denies fever. SOB with exertion; reports difficult to walk to bathroom from room with getting short of breath. Denies nasal congestion or sore throat. Denies chest pain. Denies fever. Denies exposure to allergens. Reports occasional chills and generalized body aches; reports influenza neg. On Wed. 01/30.       The history is provided by the patient.     Review of patient's allergies indicates:   Allergen Reactions    Bactrim [sulfamethoxazole-trimethoprim] Rash     Past Medical History:   Diagnosis Date    Asthma     PCOS (polycystic ovarian syndrome)      Past Surgical History:   Procedure Laterality Date    WISDOM TOOTH EXTRACTION      WRIST SURGERY      right     Family History   Problem Relation Age of Onset    Breast cancer Neg Hx     Colon cancer Neg Hx     Ovarian cancer Neg Hx      Social History     Tobacco Use    Smoking status: Former Smoker     Packs/day: 0.50     Years: 5.00     Pack years: 2.50    Smokeless tobacco: Never Used   Substance Use Topics    Alcohol use: Yes     Comment: socially    Drug use: No     Review of Systems   Constitutional: Positive for chills. Negative for activity change and fever.        Chills and body aches; denies fever.    HENT: Negative for congestion, ear discharge, ear pain, postnasal drip, rhinorrhea, sinus  pressure, sinus pain and sore throat.    Eyes: Negative.    Respiratory: Positive for cough, shortness of breath and wheezing. Negative for chest tightness.         Productive cough with clear sputum; CLIFTON.    Cardiovascular: Negative.  Negative for chest pain.   Gastrointestinal: Negative.  Negative for abdominal distention, abdominal pain and nausea.   Endocrine: Negative.    Genitourinary: Negative.  Negative for dysuria, frequency and urgency.   Musculoskeletal: Positive for myalgias. Negative for back pain.        Generalized body aches    Skin: Negative.  Negative for rash.   Allergic/Immunologic: Negative.    Neurological: Negative.  Negative for dizziness, weakness, light-headedness and numbness.   Hematological: Negative.  Does not bruise/bleed easily.   Psychiatric/Behavioral: Negative.        Physical Exam     Initial Vitals [02/04/19 0934]   BP Pulse Resp Temp SpO2   114/73 80 (!) 22 97.8 °F (36.6 °C) 97 %      MAP       --         Physical Exam    Nursing note and vitals reviewed.  Constitutional: She appears well-developed and well-nourished.   HENT:   Head: Normocephalic and atraumatic.   Right Ear: Tympanic membrane, external ear and ear canal normal.   Left Ear: Tympanic membrane, external ear and ear canal normal.   Nose: Nose normal. No rhinorrhea.   Mouth/Throat: Uvula is midline and mucous membranes are normal. No oropharyngeal exudate.   Eyes: Conjunctivae and EOM are normal. Pupils are equal, round, and reactive to light.   Neck: Normal range of motion. Neck supple.   Cardiovascular: Normal rate, regular rhythm, normal heart sounds and intact distal pulses.   Pulmonary/Chest: Effort normal and breath sounds normal. No tachypnea. She has no decreased breath sounds. She has no wheezes. She has no rhonchi. She has no rales.   BBS CTA; Strong, loose NP cough noted.    Abdominal: Soft. Bowel sounds are normal. There is no tenderness.   Musculoskeletal: Normal range of motion.   Neurological: She is  alert and oriented to person, place, and time. She has normal strength. She displays normal reflexes. No cranial nerve deficit or sensory deficit.   Skin: Skin is warm and dry. Capillary refill takes less than 2 seconds. No rash noted.   Psychiatric: She has a normal mood and affect. Her behavior is normal. Judgment and thought content normal.         ED Course   Procedures  Labs Reviewed   COMPREHENSIVE METABOLIC PANEL - Abnormal; Notable for the following components:       Result Value    Potassium 3.4 (*)     CO2 20 (*)     All other components within normal limits   CBC W/ AUTO DIFFERENTIAL - Abnormal; Notable for the following components:    WBC 15.47 (*)     Gran # (ANC) 11.0 (*)     Mono # 1.1 (*)     All other components within normal limits   PHOSPHORUS - Abnormal; Notable for the following components:    Phosphorus 2.0 (*)     All other components within normal limits   INFLUENZA A & B BY MOLECULAR   THROAT SCREEN, RAPID   CULTURE, BLOOD   CULTURE, BLOOD   CULTURE, STREP A,  THROAT   TROPONIN I   CK   CK-MB   PROTIME-INR   APTT   B-TYPE NATRIURETIC PEPTIDE   D DIMER, QUANTITATIVE   MAGNESIUM   LACTIC ACID, PLASMA   DRUG SCREEN PANEL, URINE EMERGENCY   PREGNANCY TEST, URINE RAPID   TSH   URINALYSIS, REFLEX TO URINE CULTURE   LACTIC ACID, PLASMA   URINALYSIS MICROSCOPIC          Imaging Results          X-Ray Chest 1 View (Final result)  Result time 02/04/19 10:16:19    Final result by Paradise Linton MD (02/04/19 10:16:19)                 Impression:      No acute abnormality.      Electronically signed by: Paradise Linton MD  Date:    02/04/2019  Time:    10:16             Narrative:    EXAMINATION:  XR CHEST 1 VIEW    CLINICAL HISTORY:  CP;    TECHNIQUE:  Single frontal view of the chest was performed.    COMPARISON:  09/19/2016    FINDINGS:  The lungs are clear, with normal appearance of pulmonary vasculature and no pleural effusion or pneumothorax.    The cardiac silhouette is normal in size. The hilar  and mediastinal contours are unremarkable.    Bones are intact.                                                      Clinical Impression:   The primary encounter diagnosis was Moderate asthma with status asthmaticus, unspecified whether persistent. A diagnosis of SOB (shortness of breath) was also pertinent to this visit.            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

## 2019-02-04 NOTE — CONSULTS
[facility]  Pulmonology  Consult Note    Patient Name: Gurjit Bundy  MRN: [id number]  Admission Date: 2/4/2019  Hospital Length of Stay: 0 days  Code Status: Full Code  Attending Physician: Emilee Enriquez MD  Primary Care Provider: Dong Calloway MD   Principal Problem: <principal problem not specified>    Consults  Subjective:     HPI:  Gurjit Bundy is a 24 y.o. year old female that's presents with a chief complaint of SOB and Wheezing for 5 days  PTA. This patient was initially seen at [facility] where she presented with severe wheezing and sob as well as a productive cough and nebulized tx she was given augmentin and pred . She called and stated 2 days later that she was not better and that wheezing sob was worse . She was treated 5/18 at er in [facility] for Bronchospasm . No new pets no new changes in living conditions.Works as a nurse in [employer]. Consulted to evaluate Respiratory status.    Past Medical History:   Diagnosis Date    Asthma     PCOS (polycystic ovarian syndrome)         Past Surgical History:   Procedure Laterality Date    WISDOM TOOTH EXTRACTION      WRIST SURGERY      right       Prior to Admission medications    Medication Sig Start Date End Date Taking? Authorizing Provider   albuterol (PROVENTIL) 2.5 mg /3 mL (0.083 %) nebulizer solution  4/11/18   Historical Provider, MD   azelastine (ASTELIN) 137 mcg (0.1 %) nasal spray 1 spray (137 mcg total) by Nasal route 2 (two) times daily. 5/21/18 5/21/19  Abiodun Loera PA-C   metFORMIN (GLUCOPHAGE XR) 500 MG 24 hr tablet Take 1 tablet (500 mg total) by mouth daily with breakfast. 11/27/17 11/27/18  Dylan Cadena MD   norethindrone (AYGESTIN) 5 mg Tab Take 1 tablet (5 mg total) by mouth once daily. 11/27/17 5/26/18  Dylan Cadena MD   PROAIR HFA 90 mcg/actuation inhaler  4/11/18   Historical Provider, MD       Social History     Socioeconomic History    Marital status: Single     Spouse  name: Not on file    Number of children: Not on file    Years of education: Not on file    Highest education level: Not on file   Social Needs    Financial resource strain: Not on file    Food insecurity - worry: Not on file    Food insecurity - inability: Not on file    Transportation needs - medical: Not on file    Transportation needs - non-medical: Not on file   Occupational History    Not on file   Tobacco Use    Smoking status: Former Smoker     Packs/day: 0.50     Years: 5.00     Pack years: 2.50    Smokeless tobacco: Never Used   Substance and Sexual Activity    Alcohol use: Yes     Comment: socially    Drug use: No    Sexual activity: No     Partners: Female     Birth control/protection: None     Comment: single   Other Topics Concern    Not on file   Social History Narrative    Not on file       Family History   Problem Relation Age of Onset    Breast cancer Neg Hx     Colon cancer Neg Hx     Ovarian cancer Neg Hx        Review of patient's allergies indicates:   Allergen Reactions    Bactrim [sulfamethoxazole-trimethoprim] Rash    Allergies have been reviewed.     ROS: Review of Systems   Constitutional: Positive for diaphoresis and malaise/fatigue (worsened over 2 days). Negative for chills, fever and weight loss.   HENT: Positive for congestion. Negative for ear pain and sore throat.    Eyes: Negative for double vision, photophobia and discharge.   Respiratory: Positive for cough, sputum production, shortness of breath (sob out of proportion to o2 sat) and wheezing. Negative for hemoptysis.    Cardiovascular: Positive for leg swelling (mild) and PND (occasional). Negative for palpitations.   Gastrointestinal: Negative for abdominal pain and diarrhea.   Genitourinary: Negative for dysuria and frequency.   Musculoskeletal: Positive for myalgias (generalized). Negative for back pain and neck pain.   Skin: Negative.  Negative for itching and rash.   Neurological: Positive for dizziness  "(light headed) and weakness. Negative for headaches.   Endo/Heme/Allergies: Does not bruise/bleed easily.   Psychiatric/Behavioral: Negative for depression, memory loss and substance abuse. The patient has insomnia (intermittant ).        PE:   Vitals:    02/04/19 1225 02/04/19 1312 02/04/19 1500 02/04/19 1617   BP:   130/68    BP Location:   Right arm    Patient Position:   Lying    Pulse:  89 96 82   Resp:  17 18 18   Temp:   98.1 °F (36.7 °C)    TempSrc:   Oral    SpO2:  98% 96% 96%   Weight: 113.9 kg (251 lb)      Height: 5' 7" (1.702 m)       Physical Exam    Alert and orientated X 3   HEENT: Head: Normocephalic no trauma                Ears : Normal Pinna No Drainage no Battles sign                Eyes: Vision Unchanged, No conjunctivitis,No drainage                Neck: Supple, No JVD,No Abnormal Carotid Pulsations                Throat: No Erythema, No pus,No Swelling,Mallampati score= 3    Chest: Course BS bilaterally with wheezing and decreased air entry   Cardiac: RRR S1+ S2 with a -S3: +M = 2/6, No R/H/G  Abdomen: Bowel Sounds are Normal.Soft Abdomen. No organomegaly of Liver,Spleen,or Kidneys   CNS: Non focal and intact. Cranial nerves 2, 346,8,9,10 and 12 are normal.Norrmal gait.Normal posture.  Extremities: No Clubbing,No Cyanosis with oxygen,Positive mild edema of lower extremities Bilateral  Skin: No Rash, No Ulcerative sores,and No cellulitis of the IV site.    Lab Results   Component Value Date    WBC 15.47 (H) 02/04/2019    HGB 13.1 02/04/2019    HCT 40.1 02/04/2019     02/04/2019    ALT 15 02/04/2019    AST 11 02/04/2019     02/04/2019    K 3.4 (L) 02/04/2019     02/04/2019    CREATININE 0.8 02/04/2019    BUN 13 02/04/2019    CO2 20 (L) 02/04/2019    TSH 6.851 (H) 02/04/2019    INR 0.9 02/04/2019               Assessment/Plan:     CLIFTON (dyspnea on exertion)    Normal d dimer will rule pe intermediate suspicion      Hypophosphatemia    po4 low however calcium nl will check PTH " intact      Hypokalemia    3.4 K+  May be secondary to albuterol      Elevated TSH    t4 normal      Moderate asthma with status asthmaticus    Wheezing since Friday unable to relieve cough and wheezing . 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 Antibiotics to cover atypicals will check CTA even with normal d dimer since pretest probability is intermediate and check an intact PTH since PO4 is low   Give Kpo4 8mmoles of phos/11meq of k+ iv over 6 hours  since both potassium and phosphorus are low   Thank you for your consult. I will follow-up with patient. Please contact us if you have any additional questions.     Chuck Gu MD  Pulmonology  [facility]

## 2019-02-04 NOTE — PLAN OF CARE
02/04/19 1311   Advance Directives (For Healthcare)   Advance Directive  (If Adv Dir status is received, view document under Code in header or Chart Review Media tab) Patient does not have Advance Directive, declines information.

## 2019-02-04 NOTE — H&P
[facility] Hospital Medicine  History & Physical    Patient Name: Gurjit Bundy  MRN: [id number]  Admission Date: 2/4/2019  Attending Physician: Emilee Enriquez MD   Primary Care Provider: Dong Calloway MD         Patient information was obtained from patient and ER records.     Subjective:     Principal Problem:Mild intermittent asthma with acute exacerbation    Chief Complaint:   Chief Complaint   Patient presents with    Shortness of Breath     c/o asthma since Wed of last week, saw Dr. Gu on Friday, given meds, still feeling bad this am. She went to see Dr. Gu this am and he sent her here.        HPI: 25 yo female patient with PMHX of asthma and PCOS. She presented to ER with c/o SOB. She was seen by Dr Gu last week abd given IM and po steroids. She was also using nebuilizer at home without relief. Coughing up clear sputum. SOB and CLIFTON. Went to Dr Oneil office today as she was not getting better and he sent her to ER. She is afebrile and has slight elevated WBC but has been on steroids oupt. CXR clear. She was placed in observation for steroids, nebs and O2 support. Still very SOB with minimal exertion.                Past Medical History:   Diagnosis Date    Asthma     PCOS (polycystic ovarian syndrome)        Past Surgical History:   Procedure Laterality Date    WISDOM TOOTH EXTRACTION      WRIST SURGERY      right       Review of patient's allergies indicates:   Allergen Reactions    Bactrim [sulfamethoxazole-trimethoprim] Rash       No current facility-administered medications on file prior to encounter.      Current Outpatient Medications on File Prior to Encounter   Medication Sig    albuterol (PROVENTIL) 2.5 mg /3 mL (0.083 %) nebulizer solution     azelastine (ASTELIN) 137 mcg (0.1 %) nasal spray 1 spray (137 mcg total) by Nasal route 2 (two) times daily.    metFORMIN (GLUCOPHAGE XR) 500 MG 24 hr tablet Take 1 tablet (500 mg total) by mouth daily with  breakfast.    norethindrone (AYGESTIN) 5 mg Tab Take 1 tablet (5 mg total) by mouth once daily.    PROAIR HFA 90 mcg/actuation inhaler      Family History     None        Tobacco Use    Smoking status: Former Smoker     Packs/day: 0.50     Years: 5.00     Pack years: 2.50    Smokeless tobacco: Never Used   Substance and Sexual Activity    Alcohol use: Yes     Comment: socially    Drug use: No    Sexual activity: No     Partners: Female     Birth control/protection: None     Comment: single     Review of Systems   Constitutional: Negative for activity change, fatigue, fever and unexpected weight change.   HENT: Negative for congestion, ear pain, hearing loss, rhinorrhea and sore throat.    Eyes: Negative for redness and visual disturbance.   Respiratory: Positive for cough, shortness of breath and wheezing.    Cardiovascular: Negative for chest pain, palpitations and leg swelling.   Gastrointestinal: Negative for abdominal pain, constipation, diarrhea, nausea and vomiting.   Genitourinary: Negative for dysuria, frequency and urgency.   Musculoskeletal: Negative for back pain, joint swelling and neck pain.   Skin: Negative for color change, rash and wound.   Neurological: Negative for dizziness, tremors, weakness, light-headedness and headaches.     Objective:     Vital Signs (Most Recent):  Temp: 98.1 °F (36.7 °C) (02/04/19 1500)  Pulse: 82 (02/04/19 1617)  Resp: 18 (02/04/19 1617)  BP: 130/68 (02/04/19 1500)  SpO2: 96 % (02/04/19 1617) Vital Signs (24h Range):  Temp:  [97.5 °F (36.4 °C)-98.1 °F (36.7 °C)] 98.1 °F (36.7 °C)  Pulse:  [80-96] 82  Resp:  [17-22] 18  SpO2:  [96 %-100 %] 96 %  BP: (114-130)/(68-83) 130/68     Weight: 113.9 kg (251 lb)  Body mass index is 39.31 kg/m².    Physical Exam   Constitutional: She is oriented to person, place, and time. She appears well-developed and well-nourished. No distress.   HENT:   Head: Normocephalic and atraumatic.   Right Ear: External ear normal.   Left Ear:  External ear normal.   Eyes: Conjunctivae and EOM are normal. Pupils are equal, round, and reactive to light. Right eye exhibits no discharge. Left eye exhibits no discharge.   Neck: Neck supple. No tracheal deviation present.   Cardiovascular: Normal rate and regular rhythm. Exam reveals no gallop and no friction rub.   No murmur heard.  Pulmonary/Chest: Effort normal. No respiratory distress. She has wheezes (slight, b/l bases). She has no rales.   Abdominal: Soft. Bowel sounds are normal. She exhibits no distension. There is no tenderness.   Musculoskeletal: She exhibits no edema.   Neurological: She is alert and oriented to person, place, and time. No cranial nerve deficit.   Skin: Skin is warm and dry.   Psychiatric: She has a normal mood and affect. Her behavior is normal.   Vitals reviewed.        CRANIAL NERVES     CN III, IV, VI   Pupils are equal, round, and reactive to light.  Extraocular motions are normal.        Significant Labs:   CBC:   Recent Labs   Lab 02/04/19  1004   WBC 15.47*   HGB 13.1   HCT 40.1        CMP:   Recent Labs   Lab 02/04/19  1004      K 3.4*      CO2 20*   GLU 86   BUN 13   CREATININE 0.8   CALCIUM 9.6   PROT 7.2   ALBUMIN 3.8   BILITOT 0.3   ALKPHOS 73   AST 11   ALT 15   ANIONGAP 11   EGFRNONAA >60     Cardiac Markers:   Recent Labs   Lab 02/04/19  1004   BNP 12     Lactic Acid:   Recent Labs   Lab 02/04/19  1004 02/04/19  1345   LACTATE 2.2 3.9*     Magnesium:   Recent Labs   Lab 02/04/19  1004   MG 2.1     Lab Results   Component Value Date    DDIMER <0.19 02/04/2019       Troponin:   Recent Labs   Lab 02/04/19  1004   TROPONINI <0.006     TSH:   Recent Labs   Lab 02/04/19  1004   TSH 6.851*     Urine Studies: UPT negative   UDS negative  Recent Labs   Lab 02/04/19  1038   COLORU Yellow   APPEARANCEUA Clear   PHUR 8.0   SPECGRAV 1.015   PROTEINUA Negative   GLUCUA Negative   KETONESU Negative   BILIRUBINUA Negative   OCCULTUA Negative   NITRITE Negative    UROBILINOGEN Negative   LEUKOCYTESUR 1+*   RBCUA 4   WBCUA 10*   BACTERIA Few*   SQUAMEPITHEL 15       Blood in process x 2    Rapid throat negative  Flu negative     Significant Imaging:     CXR The lungs are clear, with normal appearance of pulmonary vasculature and no pleural effusion or pneumothorax.    The cardiac silhouette is normal in size. The hilar and mediastinal contours are unremarkable.    Bones are intact.    EKG NSR    Assessment/Plan:     * Mild intermittent asthma with acute exacerbation      Stop abx, no consilidation or fevers. Likely viral if any underlying infectious process. WBC 15K, on steroids as outpatient. No fevers.  Cont nebs and wean steroids  Not on supplemental O2 but SOB with minimal exertion in the room     I see Dr. Gu ordered CTA. D-dimer was normal. She is not hypoxic or tachycardic. Awaiting his note to determine why... She is currently table other than lactic acid trending up.        Elevated lactic acid level    Lactic acid 2.2 --> 3.9  Start NS 150cc/hr  Vitals are stable  Repeat ordered       Hypophosphatemia    Replace and repeat in AM       Hypokalemia    Potassium replaced today         Elevated TSH    F/u free t4--if normal I would not start synthroid at this time  Repeat TSH as outpatient         VTE Risk Mitigation (From admission, onward)        Ordered     IP VTE HIGH RISK PATIENT  Once      02/04/19 1221     Place sequential compression device  Until discontinued      02/04/19 1221             Emilee Enriquez MD  Department of Hospital Medicine   [facility]

## 2019-02-04 NOTE — LETTER
February 6, 2019    Gurjit Bundy  [address] [id number] [address] [address]  Phone: [phone]  Fax: [phone] February 6, 2019     Patient: Gurjit Bundy   YOB: 1995   Date of Visit: 2/4/2019       To Whom It May Concern:  Please excuse Gurjit Bundy from work for the dates of 2/4/2019 through 2/6/2019. She has been an inpatient at [facility] in [place].    If you have any questions or concerns, please don't hesitate to call.    Sincerely,        Reba Cassidy RN

## 2019-02-04 NOTE — PLAN OF CARE
02/04/19 1308   Discharge Assessment   Assessment Type Discharge Planning Assessment   Confirmed/corrected address and phone number on facesheet? Yes   Assessment information obtained from? Patient   Expected Length of Stay (days) 2   Communicated expected length of stay with patient/caregiver yes   Prior to hospitilization cognitive status: Alert/Oriented   Prior to hospitalization functional status: Independent   Current cognitive status: Alert/Oriented   Current Functional Status: Independent   Facility Arrived From: pulmonary clinic   Lives With spouse   Able to Return to Prior Arrangements yes   Is patient able to care for self after discharge? Yes   Who are your caregiver(s) and their phone number(s)? [phone] spouse Alise   Patient's perception of discharge disposition home or selfcare   Patient currently being followed by outpatient case management? No   Patient currently receives any other outside agency services? No   Equipment Currently Used at Home nebulizer   Do you have any problems affording any of your prescribed medications? No   Is the patient taking medications as prescribed? yes   Does the patient have transportation home? Yes   Transportation Anticipated family or friend will provide   Dialysis Name and Scheduled days n/a   Does the patient receive services at the Coumadin Clinic? No   Discharge Plan A Home   Discharge Plan B Home with family   DME Needed Upon Discharge  none   Patient/Family in Agreement with Plan yes         Patient admitted for Asthma. Upon interview patient is talking in phrases and coughing with almost every word. She states that she lives at home with spouse. She states she has a functioning nebulizer. She denies any needs or concerns at this time for discharge. Discharge planning brochure and educational handout of what signs and symptoms to look for after discharge given to patient and family. Patient and family are encouraged to call with any questions or help  the would need. Contact information given. CM will continue to follow patient throughout the transitions of care, and assist with any discharge needs.

## 2019-02-04 NOTE — SUBJECTIVE & OBJECTIVE
Past Medical History:   Diagnosis Date    Asthma     PCOS (polycystic ovarian syndrome)        Past Surgical History:   Procedure Laterality Date    WISDOM TOOTH EXTRACTION      WRIST SURGERY      right       Review of patient's allergies indicates:   Allergen Reactions    Bactrim [sulfamethoxazole-trimethoprim] Rash       No current facility-administered medications on file prior to encounter.      Current Outpatient Medications on File Prior to Encounter   Medication Sig    albuterol (PROVENTIL) 2.5 mg /3 mL (0.083 %) nebulizer solution     azelastine (ASTELIN) 137 mcg (0.1 %) nasal spray 1 spray (137 mcg total) by Nasal route 2 (two) times daily.    metFORMIN (GLUCOPHAGE XR) 500 MG 24 hr tablet Take 1 tablet (500 mg total) by mouth daily with breakfast.    norethindrone (AYGESTIN) 5 mg Tab Take 1 tablet (5 mg total) by mouth once daily.    PROAIR HFA 90 mcg/actuation inhaler      Family History     None        Tobacco Use    Smoking status: Former Smoker     Packs/day: 0.50     Years: 5.00     Pack years: 2.50    Smokeless tobacco: Never Used   Substance and Sexual Activity    Alcohol use: Yes     Comment: socially    Drug use: No    Sexual activity: No     Partners: Female     Birth control/protection: None     Comment: single     Review of Systems   Constitutional: Negative for activity change, fatigue, fever and unexpected weight change.   HENT: Negative for congestion, ear pain, hearing loss, rhinorrhea and sore throat.    Eyes: Negative for redness and visual disturbance.   Respiratory: Positive for cough, shortness of breath and wheezing.    Cardiovascular: Negative for chest pain, palpitations and leg swelling.   Gastrointestinal: Negative for abdominal pain, constipation, diarrhea, nausea and vomiting.   Genitourinary: Negative for dysuria, frequency and urgency.   Musculoskeletal: Negative for back pain, joint swelling and neck pain.   Skin: Negative for color change, rash and wound.    Neurological: Negative for dizziness, tremors, weakness, light-headedness and headaches.     Objective:     Vital Signs (Most Recent):  Temp: 98.1 °F (36.7 °C) (02/04/19 1500)  Pulse: 82 (02/04/19 1617)  Resp: 18 (02/04/19 1617)  BP: 130/68 (02/04/19 1500)  SpO2: 96 % (02/04/19 1617) Vital Signs (24h Range):  Temp:  [97.5 °F (36.4 °C)-98.1 °F (36.7 °C)] 98.1 °F (36.7 °C)  Pulse:  [80-96] 82  Resp:  [17-22] 18  SpO2:  [96 %-100 %] 96 %  BP: (114-130)/(68-83) 130/68     Weight: 113.9 kg (251 lb)  Body mass index is 39.31 kg/m².    Physical Exam   Constitutional: She is oriented to person, place, and time. She appears well-developed and well-nourished. No distress.   HENT:   Head: Normocephalic and atraumatic.   Right Ear: External ear normal.   Left Ear: External ear normal.   Eyes: Conjunctivae and EOM are normal. Pupils are equal, round, and reactive to light. Right eye exhibits no discharge. Left eye exhibits no discharge.   Neck: Neck supple. No tracheal deviation present.   Cardiovascular: Normal rate and regular rhythm. Exam reveals no gallop and no friction rub.   No murmur heard.  Pulmonary/Chest: Effort normal. No respiratory distress. She has wheezes (slight, b/l bases). She has no rales.   Abdominal: Soft. Bowel sounds are normal. She exhibits no distension. There is no tenderness.   Musculoskeletal: She exhibits no edema.   Neurological: She is alert and oriented to person, place, and time. No cranial nerve deficit.   Skin: Skin is warm and dry.   Psychiatric: She has a normal mood and affect. Her behavior is normal.   Vitals reviewed.        CRANIAL NERVES     CN III, IV, VI   Pupils are equal, round, and reactive to light.  Extraocular motions are normal.        Significant Labs:   CBC:   Recent Labs   Lab 02/04/19  1004   WBC 15.47*   HGB 13.1   HCT 40.1        CMP:   Recent Labs   Lab 02/04/19  1004      K 3.4*      CO2 20*   GLU 86   BUN 13   CREATININE 0.8   CALCIUM 9.6   PROT  7.2   ALBUMIN 3.8   BILITOT 0.3   ALKPHOS 73   AST 11   ALT 15   ANIONGAP 11   EGFRNONAA >60     Cardiac Markers:   Recent Labs   Lab 02/04/19  1004   BNP 12     Lactic Acid:   Recent Labs   Lab 02/04/19  1004 02/04/19  1345   LACTATE 2.2 3.9*     Magnesium:   Recent Labs   Lab 02/04/19  1004   MG 2.1     Lab Results   Component Value Date    DDIMER <0.19 02/04/2019       Troponin:   Recent Labs   Lab 02/04/19  1004   TROPONINI <0.006     TSH:   Recent Labs   Lab 02/04/19  1004   TSH 6.851*     Urine Studies: UPT negative   UDS negative  Recent Labs   Lab 02/04/19  1038   COLORU Yellow   APPEARANCEUA Clear   PHUR 8.0   SPECGRAV 1.015   PROTEINUA Negative   GLUCUA Negative   KETONESU Negative   BILIRUBINUA Negative   OCCULTUA Negative   NITRITE Negative   UROBILINOGEN Negative   LEUKOCYTESUR 1+*   RBCUA 4   WBCUA 10*   BACTERIA Few*   SQUAMEPITHEL 15       Blood in process x 2    Rapid throat negative  Flu negative     Significant Imaging:     CXR The lungs are clear, with normal appearance of pulmonary vasculature and no pleural effusion or pneumothorax.    The cardiac silhouette is normal in size. The hilar and mediastinal contours are unremarkable.    Bones are intact.    EKG NSR

## 2019-02-04 NOTE — ASSESSMENT & PLAN NOTE
Stop abx, no consilidation or fevers. Likely viral if any underlying infectious process. WBC 15K, on steroids as outpatient. No fevers.  Cont nebs and wean steroids  Not on supplemental O2 but SOB with minimal exertion in the room     I see Dr. Gu ordered CTA. D-dimer was normal. She is not hypoxic or tachycardic. Awaiting his note to determine why... She is currently table other than lactic acid trending up.

## 2019-02-04 NOTE — PLAN OF CARE
Problem: Adult Inpatient Plan of Care  Goal: Plan of Care Review  Outcome: Ongoing (interventions implemented as appropriate)  Patient is compliant with fluid and medication management.  Patient is compliant with with all lab testing and procedures.  Patient remains free from all falls and injury.  NS infusing without difficulty.  IV antibiotics started per Dr. Gu.  VSS. Plan of care reviewed and agreed upon with patient.  Will continue to monitor.

## 2019-02-05 LAB
ALBUMIN SERPL BCP-MCNC: 3.7 G/DL
ALP SERPL-CCNC: 78 U/L
ALT SERPL W/O P-5'-P-CCNC: 16 U/L
ANION GAP SERPL CALC-SCNC: 12 MMOL/L
AST SERPL-CCNC: 9 U/L
BASOPHILS # BLD AUTO: 0.03 K/UL
BASOPHILS NFR BLD: 0.2 %
BILIRUB SERPL-MCNC: 0.2 MG/DL
BUN SERPL-MCNC: 10 MG/DL
CALCIUM SERPL-MCNC: 9.6 MG/DL
CHLORIDE SERPL-SCNC: 109 MMOL/L
CO2 SERPL-SCNC: 19 MMOL/L
CREAT SERPL-MCNC: 0.8 MG/DL
DIFFERENTIAL METHOD: ABNORMAL
EOSINOPHIL # BLD AUTO: 0 K/UL
EOSINOPHIL NFR BLD: 0.1 %
ERYTHROCYTE [DISTWIDTH] IN BLOOD BY AUTOMATED COUNT: 13.5 %
EST. GFR  (AFRICAN AMERICAN): >60 ML/MIN/1.73 M^2
EST. GFR  (NON AFRICAN AMERICAN): >60 ML/MIN/1.73 M^2
GLUCOSE SERPL-MCNC: 193 MG/DL
HCT VFR BLD AUTO: 39.7 %
HGB BLD-MCNC: 13 G/DL
LACTATE SERPL-SCNC: 2.6 MMOL/L
LYMPHOCYTES # BLD AUTO: 1.1 K/UL
LYMPHOCYTES NFR BLD: 6.3 %
MAGNESIUM SERPL-MCNC: 2.2 MG/DL
MCH RBC QN AUTO: 28.8 PG
MCHC RBC AUTO-ENTMCNC: 32.7 G/DL
MCV RBC AUTO: 88 FL
MONOCYTES # BLD AUTO: 0.6 K/UL
MONOCYTES NFR BLD: 3.2 %
NEUTROPHILS # BLD AUTO: 16 K/UL
NEUTROPHILS NFR BLD: 90.2 %
PHOSPHATE SERPL-MCNC: 2.3 MG/DL
PLATELET # BLD AUTO: 261 K/UL
PMV BLD AUTO: 11.9 FL
POTASSIUM SERPL-SCNC: 4.3 MMOL/L
PROT SERPL-MCNC: 7.2 G/DL
PTH-INTACT SERPL-MCNC: 74 PG/ML
RBC # BLD AUTO: 4.52 M/UL
SODIUM SERPL-SCNC: 140 MMOL/L
WBC # BLD AUTO: 17.68 K/UL

## 2019-02-05 PROCEDURE — 85025 COMPLETE CBC W/AUTO DIFF WBC: CPT

## 2019-02-05 PROCEDURE — 63600175 PHARM REV CODE 636 W HCPCS: Performed by: INTERNAL MEDICINE

## 2019-02-05 PROCEDURE — 96361 HYDRATE IV INFUSION ADD-ON: CPT

## 2019-02-05 PROCEDURE — 25000242 PHARM REV CODE 250 ALT 637 W/ HCPCS: Performed by: SURGERY

## 2019-02-05 PROCEDURE — 36415 COLL VENOUS BLD VENIPUNCTURE: CPT

## 2019-02-05 PROCEDURE — 25000003 PHARM REV CODE 250: Performed by: INTERNAL MEDICINE

## 2019-02-05 PROCEDURE — 96366 THER/PROPH/DIAG IV INF ADDON: CPT

## 2019-02-05 PROCEDURE — 96375 TX/PRO/DX INJ NEW DRUG ADDON: CPT

## 2019-02-05 PROCEDURE — 63600175 PHARM REV CODE 636 W HCPCS: Performed by: SURGERY

## 2019-02-05 PROCEDURE — 94640 AIRWAY INHALATION TREATMENT: CPT

## 2019-02-05 PROCEDURE — 83605 ASSAY OF LACTIC ACID: CPT

## 2019-02-05 PROCEDURE — 96376 TX/PRO/DX INJ SAME DRUG ADON: CPT

## 2019-02-05 PROCEDURE — 83735 ASSAY OF MAGNESIUM: CPT

## 2019-02-05 PROCEDURE — G0378 HOSPITAL OBSERVATION PER HR: HCPCS

## 2019-02-05 PROCEDURE — 94761 N-INVAS EAR/PLS OXIMETRY MLT: CPT

## 2019-02-05 PROCEDURE — 80053 COMPREHEN METABOLIC PANEL: CPT

## 2019-02-05 PROCEDURE — 63600175 PHARM REV CODE 636 W HCPCS: Performed by: NURSE PRACTITIONER

## 2019-02-05 PROCEDURE — 84100 ASSAY OF PHOSPHORUS: CPT

## 2019-02-05 PROCEDURE — 99900035 HC TECH TIME PER 15 MIN (STAT)

## 2019-02-05 PROCEDURE — 99233 PR SUBSEQUENT HOSPITAL CARE,LEVL III: ICD-10-PCS | Mod: ,,, | Performed by: INTERNAL MEDICINE

## 2019-02-05 PROCEDURE — 99233 SBSQ HOSP IP/OBS HIGH 50: CPT | Mod: ,,, | Performed by: INTERNAL MEDICINE

## 2019-02-05 PROCEDURE — 25000003 PHARM REV CODE 250: Performed by: SURGERY

## 2019-02-05 RX ORDER — METHYLPREDNISOLONE SOD SUCC 125 MG
40 VIAL (EA) INJECTION EVERY 8 HOURS
Status: DISCONTINUED | OUTPATIENT
Start: 2019-02-05 | End: 2019-02-06 | Stop reason: HOSPADM

## 2019-02-05 RX ADMIN — METHYLPREDNISOLONE SODIUM SUCCINATE 80 MG: 125 INJECTION, POWDER, FOR SOLUTION INTRAMUSCULAR; INTRAVENOUS at 05:02

## 2019-02-05 RX ADMIN — IPRATROPIUM BROMIDE AND ALBUTEROL SULFATE 3 ML: .5; 3 SOLUTION RESPIRATORY (INHALATION) at 12:02

## 2019-02-05 RX ADMIN — IPRATROPIUM BROMIDE AND ALBUTEROL SULFATE 3 ML: .5; 3 SOLUTION RESPIRATORY (INHALATION) at 03:02

## 2019-02-05 RX ADMIN — METHYLPREDNISOLONE SODIUM SUCCINATE 40 MG: 125 INJECTION, POWDER, FOR SOLUTION INTRAMUSCULAR; INTRAVENOUS at 10:02

## 2019-02-05 RX ADMIN — IPRATROPIUM BROMIDE AND ALBUTEROL SULFATE 3 ML: .5; 3 SOLUTION RESPIRATORY (INHALATION) at 11:02

## 2019-02-05 RX ADMIN — SODIUM CHLORIDE: 0.9 INJECTION, SOLUTION INTRAVENOUS at 04:02

## 2019-02-05 RX ADMIN — ONDANSETRON 4 MG: 2 INJECTION INTRAMUSCULAR; INTRAVENOUS at 06:02

## 2019-02-05 RX ADMIN — PROMETHAZINE HYDROCHLORIDE AND CODEINE PHOSPHATE 5 ML: 10; 6.25 SOLUTION ORAL at 10:02

## 2019-02-05 RX ADMIN — SODIUM CHLORIDE: 0.9 INJECTION, SOLUTION INTRAVENOUS at 09:02

## 2019-02-05 RX ADMIN — METHYLPREDNISOLONE SODIUM SUCCINATE 40 MG: 125 INJECTION, POWDER, FOR SOLUTION INTRAMUSCULAR; INTRAVENOUS at 02:02

## 2019-02-05 RX ADMIN — IPRATROPIUM BROMIDE AND ALBUTEROL SULFATE 3 ML: .5; 3 SOLUTION RESPIRATORY (INHALATION) at 07:02

## 2019-02-05 RX ADMIN — AZITHROMYCIN MONOHYDRATE 500 MG: 500 INJECTION, POWDER, LYOPHILIZED, FOR SOLUTION INTRAVENOUS at 05:02

## 2019-02-05 RX ADMIN — CEFTRIAXONE 1 G: 1 INJECTION, SOLUTION INTRAVENOUS at 04:02

## 2019-02-05 RX ADMIN — ACETAMINOPHEN 650 MG: 325 TABLET ORAL at 12:02

## 2019-02-05 RX ADMIN — SODIUM CHLORIDE: 0.9 INJECTION, SOLUTION INTRAVENOUS at 12:02

## 2019-02-05 RX ADMIN — ONDANSETRON 4 MG: 2 INJECTION INTRAMUSCULAR; INTRAVENOUS at 02:02

## 2019-02-05 RX ADMIN — PANTOPRAZOLE SODIUM 40 MG: 40 TABLET, DELAYED RELEASE ORAL at 08:02

## 2019-02-05 RX ADMIN — IPRATROPIUM BROMIDE AND ALBUTEROL SULFATE 3 ML: .5; 3 SOLUTION RESPIRATORY (INHALATION) at 04:02

## 2019-02-05 NOTE — SUBJECTIVE & OBJECTIVE
Interval note: feeling same except not wheezing  Review of Systems   Constitutional: Positive for fatigue. Negative for activity change, fever and unexpected weight change.   HENT: Negative for congestion, ear pain, hearing loss, rhinorrhea and sore throat.    Eyes: Negative for redness and visual disturbance.   Respiratory: Positive for cough, shortness of breath and wheezing.    Cardiovascular: Negative for chest pain, palpitations and leg swelling.   Gastrointestinal: Negative for abdominal pain, constipation, diarrhea, nausea and vomiting.   Genitourinary: Negative for dysuria, frequency and urgency.   Musculoskeletal: Negative for back pain, joint swelling and neck pain.   Skin: Negative for color change, rash and wound.   Neurological: Negative for dizziness, tremors, weakness, light-headedness and headaches.   Psychiatric/Behavioral: The patient is nervous/anxious.      Objective:     Vital Signs (Most Recent):  Temp: 98.1 °F (36.7 °C) (02/05/19 0717)  Pulse: 110 (02/05/19 0723)  Resp: 18 (02/05/19 0723)  BP: 124/78 (02/05/19 0717)  SpO2: 96 % (02/05/19 0723) Vital Signs (24h Range):  Temp:  [96.4 °F (35.8 °C)-98.1 °F (36.7 °C)] 98.1 °F (36.7 °C)  Pulse:  [] 110  Resp:  [17-22] 18  SpO2:  [95 %-100 %] 96 %  BP: (114-132)/(57-83) 124/78     Weight: 113.9 kg (251 lb)  Body mass index is 39.31 kg/m².    Physical Exam   Constitutional: She is oriented to person, place, and time. She appears well-developed and well-nourished. No distress.   HENT:   Head: Normocephalic and atraumatic.   Right Ear: External ear normal.   Left Ear: External ear normal.   Eyes: Conjunctivae and EOM are normal. Pupils are equal, round, and reactive to light. Right eye exhibits no discharge. Left eye exhibits no discharge.   Neck: Neck supple. No tracheal deviation present.   Cardiovascular: Normal rate and regular rhythm. Exam reveals no gallop and no friction rub.   No murmur heard.  Pulmonary/Chest: Effort normal. No  respiratory distress. She has wheezes (slight, b/l bases). She has no rales.   Abdominal: Soft. Bowel sounds are normal. She exhibits no distension. There is no tenderness.   Musculoskeletal: She exhibits no edema.   Neurological: She is alert and oriented to person, place, and time. No cranial nerve deficit.   Skin: Skin is warm and dry.   Psychiatric: She has a normal mood and affect. Her behavior is normal.   Vitals reviewed.        CRANIAL NERVES     CN III, IV, VI   Pupils are equal, round, and reactive to light.  Extraocular motions are normal.        Significant Labs:   CBC:   Recent Labs   Lab 02/04/19  1004 02/05/19  0314   WBC 15.47* 17.68*   HGB 13.1 13.0   HCT 40.1 39.7    261     CMP:   Recent Labs   Lab 02/04/19  1004 02/05/19  0314    140   K 3.4* 4.3    109   CO2 20* 19*   GLU 86 193*   BUN 13 10   CREATININE 0.8 0.8   CALCIUM 9.6 9.6   PROT 7.2 7.2   ALBUMIN 3.8 3.7   BILITOT 0.3 0.2   ALKPHOS 73 78   AST 11 9*   ALT 15 16   ANIONGAP 11 12   EGFRNONAA >60 >60     Cardiac Markers:   Recent Labs   Lab 02/04/19  1004   BNP 12     Lactic Acid:   Recent Labs   Lab 02/04/19  1803 02/04/19  2200 02/05/19  0314   LACTATE 4.1* 3.4* 2.6*     Magnesium:   Recent Labs   Lab 02/04/19  1004 02/05/19  0314   MG 2.1 2.2     Lab Results   Component Value Date    DDIMER <0.19 02/04/2019       Troponin:   Recent Labs   Lab 02/04/19  1004   TROPONINI <0.006     TSH:   Recent Labs   Lab 02/04/19  1004   TSH 6.851*     Urine Studies: UPT negative   UDS negative  Recent Labs   Lab 02/04/19  1038   COLORU Yellow   APPEARANCEUA Clear   PHUR 8.0   SPECGRAV 1.015   PROTEINUA Negative   GLUCUA Negative   KETONESU Negative   BILIRUBINUA Negative   OCCULTUA Negative   NITRITE Negative   UROBILINOGEN Negative   LEUKOCYTESUR 1+*   RBCUA 4   WBCUA 10*   BACTERIA Few*   SQUAMEPITHEL 15       Blood in process x 2    Rapid throat negative  Flu negative     Significant Imaging:     CXR The lungs are clear, with  normal appearance of pulmonary vasculature and no pleural effusion or pneumothorax.    The cardiac silhouette is normal in size. The hilar and mediastinal contours are unremarkable.    Bones are intact.    EKG NSR

## 2019-02-05 NOTE — PLAN OF CARE
02/05/19 1224   Discharge Reassessment   Assessment Type Discharge Planning Reassessment         Patient will remain for continued treatment today. Possible discharge tomorrow. No needs or concerns voiced at this time. CM will continue to follow and assist as needed.

## 2019-02-05 NOTE — PLAN OF CARE
Problem: Adult Inpatient Plan of Care  Goal: Plan of Care Review  Outcome: Ongoing (interventions implemented as appropriate)  Patient is compliant with fluid and medication management.  Patient is compliant with with all lab testing and procedures.  Patient remains free from all falls and injury.  Patient refuses heart monitor and BiPAP.  VSS. Plan of care reviewed and agreed upon with patient.  Will continue to monitor.

## 2019-02-05 NOTE — NURSING
Patient transported to third floor room 309.  NADN.  Care assumed after report received from Violeta SAWYER.  Patient in no distress.  AAOx3 with no complaints of chest pain or any other discomfort.  Patient oriented to room, bed in low position, side rails up x 2, call bell in reach.

## 2019-02-05 NOTE — SUBJECTIVE & OBJECTIVE
Interval History: Did not wear bipap will cancel not as anxious decrease steroids    Objective:     Vital Signs (Most Recent):  Temp: 98.1 °F (36.7 °C) (02/05/19 0717)  Pulse: 110 (02/05/19 0723)  Resp: 18 (02/05/19 0723)  BP: 124/78 (02/05/19 0717)  SpO2: 96 % (02/05/19 0723) Vital Signs (24h Range):  Temp:  [96.4 °F (35.8 °C)-98.1 °F (36.7 °C)] 98.1 °F (36.7 °C)  Pulse:  [] 110  Resp:  [17-20] 18  SpO2:  [95 %-100 %] 96 %  BP: (115-132)/(57-83) 124/78     Weight: 113.9 kg (251 lb)  Body mass index is 39.31 kg/m².      Intake/Output Summary (Last 24 hours) at 2/5/2019 1205  Last data filed at 2/5/2019 0820  Gross per 24 hour   Intake 3305 ml   Output --   Net 3305 ml       Physical Exam   Constitutional: She is oriented to person, place, and time. She appears well-developed and well-nourished. She is cooperative.  Non-toxic appearance. She does not appear ill. No distress.   HENT:   Head: Normocephalic and atraumatic.   Right Ear: Hearing, tympanic membrane, external ear and ear canal normal.   Left Ear: Hearing, tympanic membrane, external ear and ear canal normal.   Nose: Nose normal. No mucosal edema, rhinorrhea or nasal deformity. No epistaxis. Right sinus exhibits no maxillary sinus tenderness and no frontal sinus tenderness. Left sinus exhibits no maxillary sinus tenderness and no frontal sinus tenderness.   Mouth/Throat: Uvula is midline, oropharynx is clear and moist and mucous membranes are normal. No trismus in the jaw. Normal dentition. No uvula swelling. No posterior oropharyngeal erythema.   Eyes: Conjunctivae and lids are normal. No scleral icterus.   Sclera clear bilat   Neck: Trachea normal, full passive range of motion without pain and phonation normal. Neck supple.   Cardiovascular: Normal rate, regular rhythm, normal heart sounds, intact distal pulses and normal pulses.   Pulmonary/Chest: She is in respiratory distress (mild to moderate). She has decreased breath sounds in the right lower  field and the left lower field. She has no wheezes. She has no rhonchi. She has no rales.   Abdominal: Soft. Normal appearance and bowel sounds are normal. She exhibits no distension. There is no tenderness.   Musculoskeletal: Normal range of motion. She exhibits no edema or deformity.   Neurological: She is alert and oriented to person, place, and time. She exhibits normal muscle tone. Coordination normal.   Skin: Skin is warm, dry and intact. She is not diaphoretic. No pallor.   Psychiatric: She has a normal mood and affect. Her speech is normal and behavior is normal. Judgment and thought content normal. Cognition and memory are normal.   Nursing note and vitals reviewed.      Vents:       Lines/Drains/Airways     Peripheral Intravenous Line                 Peripheral IV - Single Lumen 02/04/19 1037 Left Antecubital 1 day                Significant Labs:    CBC/Anemia Profile:  Recent Labs   Lab 02/04/19  1004 02/05/19  0314   WBC 15.47* 17.68*   HGB 13.1 13.0   HCT 40.1 39.7    261   MCV 87 88   RDW 13.5 13.5        Chemistries:  Recent Labs   Lab 02/04/19  1004 02/05/19  0314    140   K 3.4* 4.3    109   CO2 20* 19*   BUN 13 10   CREATININE 0.8 0.8   CALCIUM 9.6 9.6   ALBUMIN 3.8 3.7   PROT 7.2 7.2   BILITOT 0.3 0.2   ALKPHOS 73 78   ALT 15 16   AST 11 9*   MG 2.1 2.2   PHOS 2.0* 2.3*       All pertinent labs within the past 24 hours have been reviewed.    Significant Imaging:  I have reviewed all pertinent imaging results/findings within the past 24 hours.

## 2019-02-05 NOTE — PROGRESS NOTES
[facility] Hospital Medicine  Progress Note    Patient Name: Gurjit Bundy  MRN: [id number]  Patient Class: OP- Observation   Admission Date: 2/4/2019  Length of Stay: 0 days  Attending Physician: Emilee Enriquez MD  Primary Care Provider: Dong Calloway MD        Subjective:     Principal Problem:Mild intermittent asthma with acute exacerbation    HPI:  25 yo female patient with PMHX of asthma and PCOS. She presented to ER with c/o SOB. She was seen by Dr Gu last week abd given IM and po steroids. She was also using nebuilizer at home without relief. Coughing up clear sputum. SOB and [place]. Went to Dr Oneil office today as she was not getting better and he sent her to ER. She is afebrile and has slight elevated WBC but has been on steroids oupt. CXR clear. She was placed in observation for steroids, nebs and O2 support. Still very SOB with minimal exertion.                Hospital Course:  She is sitting up in bed. She reports that she doesn't feel much better. Feels weak and SOB. No longer wheezing though. She reports that she was seen by Dr Gu yesterday. He reordered her abx. Remains on medrol 80mg IV q 8hr and nebs every 4 hr. No fever. WBC is a little higher but also on steroids. CTA shows no pathology. She does report relief with phenergan/codiene cough syrup     Interval note: feeling same except not wheezing  Review of Systems   Constitutional: Positive for fatigue. Negative for activity change, fever and unexpected weight change.   HENT: Negative for congestion, ear pain, hearing loss, rhinorrhea and sore throat.    Eyes: Negative for redness and visual disturbance.   Respiratory: Positive for cough, shortness of breath and wheezing.    Cardiovascular: Negative for chest pain, palpitations and leg swelling.   Gastrointestinal: Negative for abdominal pain, constipation, diarrhea, nausea and vomiting.   Genitourinary: Negative for dysuria, frequency and urgency.    Musculoskeletal: Negative for back pain, joint swelling and neck pain.   Skin: Negative for color change, rash and wound.   Neurological: Negative for dizziness, tremors, weakness, light-headedness and headaches.   Psychiatric/Behavioral: The patient is nervous/anxious.      Objective:     Vital Signs (Most Recent):  Temp: 98.1 °F (36.7 °C) (02/05/19 0717)  Pulse: 110 (02/05/19 0723)  Resp: 18 (02/05/19 0723)  BP: 124/78 (02/05/19 0717)  SpO2: 96 % (02/05/19 0723) Vital Signs (24h Range):  Temp:  [96.4 °F (35.8 °C)-98.1 °F (36.7 °C)] 98.1 °F (36.7 °C)  Pulse:  [] 110  Resp:  [17-22] 18  SpO2:  [95 %-100 %] 96 %  BP: (114-132)/(57-83) 124/78     Weight: 113.9 kg (251 lb)  Body mass index is 39.31 kg/m².    Physical Exam   Constitutional: She is oriented to person, place, and time. She appears well-developed and well-nourished. No distress.   HENT:   Head: Normocephalic and atraumatic.   Right Ear: External ear normal.   Left Ear: External ear normal.   Eyes: Conjunctivae and EOM are normal. Pupils are equal, round, and reactive to light. Right eye exhibits no discharge. Left eye exhibits no discharge.   Neck: Neck supple. No tracheal deviation present.   Cardiovascular: Normal rate and regular rhythm. Exam reveals no gallop and no friction rub.   No murmur heard.  Pulmonary/Chest: Effort normal. No respiratory distress. She has wheezes (slight, b/l bases). She has no rales.   Abdominal: Soft. Bowel sounds are normal. She exhibits no distension. There is no tenderness.   Musculoskeletal: She exhibits no edema.   Neurological: She is alert and oriented to person, place, and time. No cranial nerve deficit.   Skin: Skin is warm and dry.   Psychiatric: She has a normal mood and affect. Her behavior is normal.   Vitals reviewed.        CRANIAL NERVES     CN III, IV, VI   Pupils are equal, round, and reactive to light.  Extraocular motions are normal.        Significant Labs:   CBC:   Recent Labs   Lab  02/04/19  1004 02/05/19  0314   WBC 15.47* 17.68*   HGB 13.1 13.0   HCT 40.1 39.7    261     CMP:   Recent Labs   Lab 02/04/19  1004 02/05/19  0314    140   K 3.4* 4.3    109   CO2 20* 19*   GLU 86 193*   BUN 13 10   CREATININE 0.8 0.8   CALCIUM 9.6 9.6   PROT 7.2 7.2   ALBUMIN 3.8 3.7   BILITOT 0.3 0.2   ALKPHOS 73 78   AST 11 9*   ALT 15 16   ANIONGAP 11 12   EGFRNONAA >60 >60     Cardiac Markers:   Recent Labs   Lab 02/04/19  1004   BNP 12     Lactic Acid:   Recent Labs   Lab 02/04/19  1803 02/04/19  2200 02/05/19  0314   LACTATE 4.1* 3.4* 2.6*     Magnesium:   Recent Labs   Lab 02/04/19  1004 02/05/19  0314   MG 2.1 2.2     Lab Results   Component Value Date    DDIMER <0.19 02/04/2019       Troponin:   Recent Labs   Lab 02/04/19  1004   TROPONINI <0.006     TSH:   Recent Labs   Lab 02/04/19  1004   TSH 6.851*     Urine Studies: UPT negative   UDS negative  Recent Labs   Lab 02/04/19  1038   COLORU Yellow   APPEARANCEUA Clear   PHUR 8.0   SPECGRAV 1.015   PROTEINUA Negative   GLUCUA Negative   KETONESU Negative   BILIRUBINUA Negative   OCCULTUA Negative   NITRITE Negative   UROBILINOGEN Negative   LEUKOCYTESUR 1+*   RBCUA 4   WBCUA 10*   BACTERIA Few*   SQUAMEPITHEL 15       Blood in process x 2    Rapid throat negative  Flu negative     Significant Imaging:     CXR The lungs are clear, with normal appearance of pulmonary vasculature and no pleural effusion or pneumothorax.    The cardiac silhouette is normal in size. The hilar and mediastinal contours are unremarkable.    Bones are intact.    EKG NSR    Assessment/Plan:      * Mild intermittent asthma with acute exacerbation      Stop abx, no consilidation or fevers. Likely viral if any underlying infectious process. WBC 15K, on steroids as outpatient. No fevers.  Cont nebs and wean steroids  Not on supplemental O2 but SOB with minimal exertion in the room     I see Dr. Gu ordered CTA. D-dimer was normal. She is not hypoxic or tachycardic.  Awaiting his note to determine why... She is currently table other than lactic acid trending up.   2/5/19    CTA was negative  abx reordered per Dr Gu  Wean steroids today.     Elevated lactic acid level    Lactic acid 2.2 --> 4.1>> now down to 2.6 d/c  Start NS 150cc/hr  Vitals are stable.         Hypophosphatemia    Lab Results   Component Value Date    CALCIUM 9.6 02/05/2019    PHOS 2.3 (L) 02/05/2019     Replaced yesterday .     Hypokalemia    Potassium replaced   4.3 today.       Elevated TSH    F/u free t4--if normal I would not start synthroid at this time  Repeat TSH as outpatient.         VTE Risk Mitigation (From admission, onward)        Ordered     IP VTE HIGH RISK PATIENT  Once      02/04/19 1221     Place sequential compression device  Until discontinued      02/04/19 1221              Brianna Castillo MD  Department of Hospital Medicine   [facility]

## 2019-02-05 NOTE — ASSESSMENT & PLAN NOTE
Lab Results   Component Value Date    CALCIUM 9.6 02/05/2019    PHOS 2.3 (L) 02/05/2019     Replaced yesterday .

## 2019-02-05 NOTE — PLAN OF CARE
Problem: Adult Inpatient Plan of Care  Goal: Plan of Care Review  Outcome: Ongoing (interventions implemented as appropriate)  Patient rested well throughout shift. Room air. Tele refused. SCDs refused. Alert and oriented x4. Spouse at bedside. IV fluids continued. IV antibiotics continued. Patient complained of nausea. Denies pain. Free from fall or injury. Plan of care reviewed with patient.

## 2019-02-05 NOTE — PROGRESS NOTES
Staff Handoff   Bedside report per SILVERIO Melton. No distress noted. Room air. Tele refused. SCDs refused. Spouse at bedside. Awake, alert, oriented. Denies pain. Call bell in reach. Encouraged to call for assistance.     Resident Handoff

## 2019-02-05 NOTE — ASSESSMENT & PLAN NOTE
Stop abx, no consilidation or fevers. Likely viral if any underlying infectious process. WBC 15K, on steroids as outpatient. No fevers.  Cont nebs and wean steroids  Not on supplemental O2 but SOB with minimal exertion in the room     I see Dr. uG ordered CTA. D-dimer was normal. She is not hypoxic or tachycardic. Awaiting his note to determine why... She is currently table other than lactic acid trending up.   2/5/19    CTA was negative  abx reordered per Dr Gu  Wean steroids today.

## 2019-02-05 NOTE — ASSESSMENT & PLAN NOTE
Much better air entry should improve better   Wheezing since Friday unable to relieve cough and wheezing . 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

## 2019-02-05 NOTE — PROGRESS NOTES
[facility]  Pulmonology  Progress Note    Patient Name: Gurjit Bundy  MRN: [id number]  Admission Date: 2/4/2019  Hospital Length of Stay: 0 days  Code Status: Full Code  Attending Provider: Emilee Enriquez MD  Primary Care Provider: Dong Calloway MD   Principal Problem: Mild intermittent asthma with acute exacerbation    Subjective:     Interval History: Did not wear bipap will cancel not as anxious decrease steroids    Objective:     Vital Signs (Most Recent):  Temp: 98.1 °F (36.7 °C) (02/05/19 0717)  Pulse: 110 (02/05/19 0723)  Resp: 18 (02/05/19 0723)  BP: 124/78 (02/05/19 0717)  SpO2: 96 % (02/05/19 0723) Vital Signs (24h Range):  Temp:  [96.4 °F (35.8 °C)-98.1 °F (36.7 °C)] 98.1 °F (36.7 °C)  Pulse:  [] 110  Resp:  [17-20] 18  SpO2:  [95 %-100 %] 96 %  BP: (115-132)/(57-83) 124/78     Weight: 113.9 kg (251 lb)  Body mass index is 39.31 kg/m².      Intake/Output Summary (Last 24 hours) at 2/5/2019 1205  Last data filed at 2/5/2019 0820  Gross per 24 hour   Intake 3305 ml   Output --   Net 3305 ml       Physical Exam   Constitutional: She is oriented to person, place, and time. She appears well-developed and well-nourished. She is cooperative.  Non-toxic appearance. She does not appear ill. No distress.   HENT:   Head: Normocephalic and atraumatic.   Right Ear: Hearing, tympanic membrane, external ear and ear canal normal.   Left Ear: Hearing, tympanic membrane, external ear and ear canal normal.   Nose: Nose normal. No mucosal edema, rhinorrhea or nasal deformity. No epistaxis. Right sinus exhibits no maxillary sinus tenderness and no frontal sinus tenderness. Left sinus exhibits no maxillary sinus tenderness and no frontal sinus tenderness.   Mouth/Throat: Uvula is midline, oropharynx is clear and moist and mucous membranes are normal. No trismus in the jaw. Normal dentition. No uvula swelling. No posterior oropharyngeal erythema.   Eyes: Conjunctivae and lids are normal.  No scleral icterus.   Sclera clear bilat   Neck: Trachea normal, full passive range of motion without pain and phonation normal. Neck supple.   Cardiovascular: Normal rate, regular rhythm, normal heart sounds, intact distal pulses and normal pulses.   Pulmonary/Chest: She is in respiratory distress (mild to moderate). She has decreased breath sounds in the right lower field and the left lower field. She has no wheezes. She has no rhonchi. She has no rales.   Abdominal: Soft. Normal appearance and bowel sounds are normal. She exhibits no distension. There is no tenderness.   Musculoskeletal: Normal range of motion. She exhibits no edema or deformity.   Neurological: She is alert and oriented to person, place, and time. She exhibits normal muscle tone. Coordination normal.   Skin: Skin is warm, dry and intact. She is not diaphoretic. No pallor.   Psychiatric: She has a normal mood and affect. Her speech is normal and behavior is normal. Judgment and thought content normal. Cognition and memory are normal.   Nursing note and vitals reviewed.      Vents:       Lines/Drains/Airways     Peripheral Intravenous Line                 Peripheral IV - Single Lumen 02/04/19 1037 Left Antecubital 1 day                Significant Labs:    CBC/Anemia Profile:  Recent Labs   Lab 02/04/19  1004 02/05/19  0314   WBC 15.47* 17.68*   HGB 13.1 13.0   HCT 40.1 39.7    261   MCV 87 88   RDW 13.5 13.5        Chemistries:  Recent Labs   Lab 02/04/19  1004 02/05/19  0314    140   K 3.4* 4.3    109   CO2 20* 19*   BUN 13 10   CREATININE 0.8 0.8   CALCIUM 9.6 9.6   ALBUMIN 3.8 3.7   PROT 7.2 7.2   BILITOT 0.3 0.2   ALKPHOS 73 78   ALT 15 16   AST 11 9*   MG 2.1 2.2   PHOS 2.0* 2.3*       All pertinent labs within the past 24 hours have been reviewed.    Significant Imaging:  I have reviewed all pertinent imaging results/findings within the past 24 hours.    Assessment/Plan:     * Mild intermittent asthma with acute  exacerbation    Much better air entry should improve better   Wheezing since Friday unable to relieve cough and wheezing . 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

## 2019-02-05 NOTE — HOSPITAL COURSE
She is sitting up in bed. She reports that she doesn't feel much better. Feels weak and SOB. No longer wheezing though. She reports that she was seen by Dr Gu yesterday. He reordered her abx. Remains on medrol 80mg IV q 8hr and nebs every 4 hr. No fever. WBC is a little higher but also on steroids. CTA shows no pathology. She does report relief with phenergan/codiene cough syrup     2/6/19  She reports that she is feeling better today. No wheezing. Still SOB with getting up to bathroom. POX 99% on RA. Steroids are making her flushed and swollen. WBC is increasing but on medrol 40mg IV every 8hr.

## 2019-02-06 VITALS
OXYGEN SATURATION: 96 % | HEIGHT: 67 IN | TEMPERATURE: 97 F | SYSTOLIC BLOOD PRESSURE: 122 MMHG | HEART RATE: 90 BPM | RESPIRATION RATE: 18 BRPM | BODY MASS INDEX: 39.39 KG/M2 | DIASTOLIC BLOOD PRESSURE: 81 MMHG | WEIGHT: 251 LBS

## 2019-02-06 PROBLEM — D72.823 LEUKEMOID REACTION: Status: ACTIVE | Noted: 2019-02-06

## 2019-02-06 LAB
ALBUMIN SERPL BCP-MCNC: 3.5 G/DL
ALP SERPL-CCNC: 76 U/L
ALT SERPL W/O P-5'-P-CCNC: 13 U/L
ANION GAP SERPL CALC-SCNC: 10 MMOL/L
AST SERPL-CCNC: 8 U/L
B PARAPERT DNA NPH QL NAA+PROBE: NORMAL
B PERT DNA NPH QL NAA+PROBE: NORMAL
BACTERIA THROAT CULT: NORMAL
BASOPHILS # BLD AUTO: 0.03 K/UL
BASOPHILS NFR BLD: 0.1 %
BILIRUB SERPL-MCNC: 0.3 MG/DL
BUN SERPL-MCNC: 10 MG/DL
CALCIUM SERPL-MCNC: 9.2 MG/DL
CHLORIDE SERPL-SCNC: 106 MMOL/L
CO2 SERPL-SCNC: 23 MMOL/L
CREAT SERPL-MCNC: 1 MG/DL
DIFFERENTIAL METHOD: ABNORMAL
EOSINOPHIL # BLD AUTO: 0 K/UL
EOSINOPHIL NFR BLD: 0.1 %
ERYTHROCYTE [DISTWIDTH] IN BLOOD BY AUTOMATED COUNT: 13.8 %
EST. GFR  (AFRICAN AMERICAN): >60 ML/MIN/1.73 M^2
EST. GFR  (NON AFRICAN AMERICAN): >60 ML/MIN/1.73 M^2
GLUCOSE SERPL-MCNC: 205 MG/DL
HCT VFR BLD AUTO: 42 %
HGB BLD-MCNC: 13.4 G/DL
LYMPHOCYTES # BLD AUTO: 1.4 K/UL
LYMPHOCYTES NFR BLD: 5.7 %
MCH RBC QN AUTO: 28.5 PG
MCHC RBC AUTO-ENTMCNC: 31.9 G/DL
MCV RBC AUTO: 89 FL
MONOCYTES # BLD AUTO: 1 K/UL
MONOCYTES NFR BLD: 4 %
NEUTROPHILS # BLD AUTO: 21.7 K/UL
NEUTROPHILS NFR BLD: 91.3 %
PHOSPHATE SERPL-MCNC: 3.9 MG/DL
PLATELET # BLD AUTO: 291 K/UL
PMV BLD AUTO: 12 FL
POTASSIUM SERPL-SCNC: 3.8 MMOL/L
PROT SERPL-MCNC: 6.9 G/DL
RBC # BLD AUTO: 4.71 M/UL
SODIUM SERPL-SCNC: 139 MMOL/L
SPECIMEN SOURCE: NORMAL
WBC # BLD AUTO: 24.12 K/UL

## 2019-02-06 PROCEDURE — 25000242 PHARM REV CODE 250 ALT 637 W/ HCPCS: Performed by: SURGERY

## 2019-02-06 PROCEDURE — 96361 HYDRATE IV INFUSION ADD-ON: CPT

## 2019-02-06 PROCEDURE — 63600175 PHARM REV CODE 636 W HCPCS: Performed by: INTERNAL MEDICINE

## 2019-02-06 PROCEDURE — 94761 N-INVAS EAR/PLS OXIMETRY MLT: CPT

## 2019-02-06 PROCEDURE — 25000003 PHARM REV CODE 250: Performed by: SURGERY

## 2019-02-06 PROCEDURE — G0378 HOSPITAL OBSERVATION PER HR: HCPCS

## 2019-02-06 PROCEDURE — 96376 TX/PRO/DX INJ SAME DRUG ADON: CPT

## 2019-02-06 PROCEDURE — 63600175 PHARM REV CODE 636 W HCPCS: Performed by: NURSE PRACTITIONER

## 2019-02-06 PROCEDURE — 94640 AIRWAY INHALATION TREATMENT: CPT

## 2019-02-06 PROCEDURE — 80053 COMPREHEN METABOLIC PANEL: CPT

## 2019-02-06 PROCEDURE — 25000003 PHARM REV CODE 250: Performed by: INTERNAL MEDICINE

## 2019-02-06 PROCEDURE — 85025 COMPLETE CBC W/AUTO DIFF WBC: CPT

## 2019-02-06 PROCEDURE — 36415 COLL VENOUS BLD VENIPUNCTURE: CPT

## 2019-02-06 PROCEDURE — 84100 ASSAY OF PHOSPHORUS: CPT

## 2019-02-06 RX ORDER — PREDNISONE 10 MG/1
TABLET ORAL
Qty: 30 TABLET | Refills: 0 | Status: SHIPPED | OUTPATIENT
Start: 2019-02-06 | End: 2019-04-06 | Stop reason: ALTCHOICE

## 2019-02-06 RX ORDER — FLUTICASONE FUROATE AND VILANTEROL 200; 25 UG/1; UG/1
1 POWDER RESPIRATORY (INHALATION) DAILY
Qty: 60 EACH | Refills: 3 | Status: SHIPPED | OUTPATIENT
Start: 2019-02-06

## 2019-02-06 RX ADMIN — IPRATROPIUM BROMIDE AND ALBUTEROL SULFATE 3 ML: .5; 3 SOLUTION RESPIRATORY (INHALATION) at 11:02

## 2019-02-06 RX ADMIN — PANTOPRAZOLE SODIUM 40 MG: 40 TABLET, DELAYED RELEASE ORAL at 08:02

## 2019-02-06 RX ADMIN — IPRATROPIUM BROMIDE AND ALBUTEROL SULFATE 3 ML: .5; 3 SOLUTION RESPIRATORY (INHALATION) at 04:02

## 2019-02-06 RX ADMIN — IPRATROPIUM BROMIDE AND ALBUTEROL SULFATE 3 ML: .5; 3 SOLUTION RESPIRATORY (INHALATION) at 07:02

## 2019-02-06 RX ADMIN — METHYLPREDNISOLONE SODIUM SUCCINATE 40 MG: 125 INJECTION, POWDER, FOR SOLUTION INTRAMUSCULAR; INTRAVENOUS at 05:02

## 2019-02-06 RX ADMIN — CEFTRIAXONE 1 G: 1 INJECTION, SOLUTION INTRAVENOUS at 05:02

## 2019-02-06 RX ADMIN — SODIUM CHLORIDE: 0.9 INJECTION, SOLUTION INTRAVENOUS at 04:02

## 2019-02-06 NOTE — SUBJECTIVE & OBJECTIVE
Interval note: feeling better no wheezing  Review of Systems   Constitutional: Positive for fatigue. Negative for activity change, fever and unexpected weight change.   HENT: Negative for congestion, ear pain, hearing loss, rhinorrhea and sore throat.    Eyes: Negative for redness and visual disturbance.   Respiratory: Positive for cough, shortness of breath and wheezing.    Cardiovascular: Negative for chest pain, palpitations and leg swelling.   Gastrointestinal: Negative for abdominal pain, constipation, diarrhea, nausea and vomiting.   Genitourinary: Negative for dysuria, frequency and urgency.   Musculoskeletal: Negative for back pain, joint swelling and neck pain.   Skin: Negative for color change, rash and wound.   Neurological: Negative for dizziness, tremors, weakness, light-headedness and headaches.   Psychiatric/Behavioral: The patient is nervous/anxious.      Objective:     Vital Signs (Most Recent):  Temp: 97.8 °F (36.6 °C) (02/06/19 0745)  Pulse: 95 (02/06/19 0745)  Resp: 18 (02/06/19 0745)  BP: 130/67 (02/06/19 0745)  SpO2: 99 % (02/06/19 0745) Vital Signs (24h Range):  Temp:  [97 °F (36.1 °C)-97.8 °F (36.6 °C)] 97.8 °F (36.6 °C)  Pulse:  [] 95  Resp:  [16-20] 18  SpO2:  [96 %-99 %] 99 %  BP: (102-132)/(53-73) 130/67     Weight: 113.9 kg (251 lb)  Body mass index is 39.31 kg/m².    Physical Exam   Constitutional: She is oriented to person, place, and time. She appears well-developed and well-nourished. No distress.   HENT:   Head: Normocephalic and atraumatic.   Right Ear: External ear normal.   Left Ear: External ear normal.   Eyes: Conjunctivae and EOM are normal. Pupils are equal, round, and reactive to light. Right eye exhibits no discharge. Left eye exhibits no discharge.   Neck: Neck supple. No tracheal deviation present.   Cardiovascular: Normal rate and regular rhythm. Exam reveals no gallop and no friction rub.   No murmur heard.  Pulmonary/Chest: Effort normal. No respiratory distress.  She has wheezes (slight, b/l bases). She has no rales.   Abdominal: Soft. Bowel sounds are normal. She exhibits no distension. There is no tenderness.   Musculoskeletal: She exhibits no edema.   Neurological: She is alert and oriented to person, place, and time. No cranial nerve deficit.   Skin: Skin is warm and dry.   Psychiatric: She has a normal mood and affect. Her behavior is normal.   Vitals reviewed.        CRANIAL NERVES     CN III, IV, VI   Pupils are equal, round, and reactive to light.  Extraocular motions are normal.        Significant Labs:   CBC:   Recent Labs   Lab 02/05/19 0314 02/06/19  0556   WBC 17.68* 24.12*   HGB 13.0 13.4   HCT 39.7 42.0    291     CMP:   Recent Labs   Lab 02/05/19 0314 02/06/19  0556    139   K 4.3 3.8    106   CO2 19* 23   * 205*   BUN 10 10   CREATININE 0.8 1.0   CALCIUM 9.6 9.2   PROT 7.2 6.9   ALBUMIN 3.7 3.5   BILITOT 0.2 0.3   ALKPHOS 78 76   AST 9* 8*   ALT 16 13   ANIONGAP 12 10   EGFRNONAA >60 >60       Lactic Acid:   Recent Labs   Lab 02/04/19  1803 02/04/19  2200 02/05/19  0314   LACTATE 4.1* 3.4* 2.6*     Magnesium:   Recent Labs   Lab 02/05/19  0314   MG 2.2     Lab Results   Component Value Date    DDIMER <0.19 02/04/2019       TSH:   Recent Labs   Lab 02/04/19  1004   TSH 6.851*     Urine Studies: UPT negative   UDS negative  Recent Labs   Lab 02/04/19  1038   COLORU Yellow   APPEARANCEUA Clear   PHUR 8.0   SPECGRAV 1.015   PROTEINUA Negative   GLUCUA Negative   KETONESU Negative   BILIRUBINUA Negative   OCCULTUA Negative   NITRITE Negative   UROBILINOGEN Negative   LEUKOCYTESUR 1+*   RBCUA 4   WBCUA 10*   BACTERIA Few*   SQUAMEPITHEL 15       Blood in process x 2    Rapid throat negative  Flu negative     Significant Imaging:     CXR The lungs are clear, with normal appearance of pulmonary vasculature and no pleural effusion or pneumothorax.    The cardiac silhouette is normal in size. The hilar and mediastinal contours are  unremarkable.    Bones are intact.    EKG NSR    CTA chest   1.  Negative for acute pulmonary emboli.  Negative for thoracic aortic aneurysm or dissection.

## 2019-02-06 NOTE — PLAN OF CARE
02/06/19 1243   Final Note   Assessment Type Final Discharge Note   Anticipated Discharge Disposition Home   What phone number can be called within the next 1-3 days to see how you are doing after discharge? [phone]   Hospital Follow Up  Appt(s) scheduled? Yes   Discharge plans and expectations educations in teach back method with documentation complete? Yes   Right Care Referral Info   Post Acute Recommendation No Care         Patient will discharge home today with spouse. Patient reminded about what signs and symptoms to look for when discharged, and educated that if any symptoms return, make a same day appointment with PCP or come back to the ED. Patient states understanding and agreement. Patient denies any other needs or concerns for discharge. ]

## 2019-02-06 NOTE — ASSESSMENT & PLAN NOTE
Stop abx, no consilidation or fevers. Likely viral if any underlying infectious process. WBC 15K, on steroids as outpatient. No fevers.  Cont nebs and wean steroids  Not on supplemental O2 but SOB with minimal exertion in the room     I see Dr. Gu ordered CTA. D-dimer was normal. She is not hypoxic or tachycardic. Awaiting his note to determine why... She is currently table other than lactic acid trending up.   2/5/19    CTA was negative  abx reordered per Dr Gu  Wean steroids today.    D/c on steroid taper  breo  No more abx

## 2019-02-06 NOTE — SUBJECTIVE & OBJECTIVE
Interval note: feeling better no wheezing  Review of Systems   Constitutional: Positive for fatigue. Negative for activity change, fever and unexpected weight change.   HENT: Negative for congestion, ear pain, hearing loss, rhinorrhea and sore throat.    Eyes: Negative for redness and visual disturbance.   Respiratory: Positive for cough, shortness of breath and wheezing.    Cardiovascular: Negative for chest pain, palpitations and leg swelling.   Gastrointestinal: Negative for abdominal pain, constipation, diarrhea, nausea and vomiting.   Genitourinary: Negative for dysuria, frequency and urgency.   Musculoskeletal: Negative for back pain, joint swelling and neck pain.   Skin: Negative for color change, rash and wound.   Neurological: Negative for dizziness, tremors, weakness, light-headedness and headaches.   Psychiatric/Behavioral: The patient is nervous/anxious.      Objective:     Vital Signs (Most Recent):  Temp: 97.4 °F (36.3 °C) (02/06/19 1134)  Pulse: 90 (02/06/19 1134)  Resp: 18 (02/06/19 1134)  BP: 122/81 (02/06/19 1134)  SpO2: 96 % (02/06/19 1134) Vital Signs (24h Range):  Temp:  [97 °F (36.1 °C)-97.8 °F (36.6 °C)] 97.4 °F (36.3 °C)  Pulse:  [] 90  Resp:  [16-20] 18  SpO2:  [96 %-99 %] 96 %  BP: (102-132)/(53-81) 122/81     Weight: 113.9 kg (251 lb)  Body mass index is 39.31 kg/m².    Physical Exam   Constitutional: She is oriented to person, place, and time. She appears well-developed and well-nourished. No distress.   HENT:   Head: Normocephalic and atraumatic.   Right Ear: External ear normal.   Left Ear: External ear normal.   Eyes: Conjunctivae and EOM are normal. Pupils are equal, round, and reactive to light. Right eye exhibits no discharge. Left eye exhibits no discharge.   Neck: Neck supple. No tracheal deviation present.   Cardiovascular: Normal rate and regular rhythm. Exam reveals no gallop and no friction rub.   No murmur heard.  Pulmonary/Chest: Effort normal. No respiratory distress.  She has wheezes (slight, b/l bases). She has no rales.   Abdominal: Soft. Bowel sounds are normal. She exhibits no distension. There is no tenderness.   Musculoskeletal: She exhibits no edema.   Neurological: She is alert and oriented to person, place, and time. No cranial nerve deficit.   Skin: Skin is warm and dry.   Psychiatric: She has a normal mood and affect. Her behavior is normal.   Vitals reviewed.        CRANIAL NERVES     CN III, IV, VI   Pupils are equal, round, and reactive to light.  Extraocular motions are normal.        Significant Labs:   CBC:   Recent Labs   Lab 02/05/19 0314 02/06/19  0556   WBC 17.68* 24.12*   HGB 13.0 13.4   HCT 39.7 42.0    291     CMP:   Recent Labs   Lab 02/05/19 0314 02/06/19  0556    139   K 4.3 3.8    106   CO2 19* 23   * 205*   BUN 10 10   CREATININE 0.8 1.0   CALCIUM 9.6 9.2   PROT 7.2 6.9   ALBUMIN 3.7 3.5   BILITOT 0.2 0.3   ALKPHOS 78 76   AST 9* 8*   ALT 16 13   ANIONGAP 12 10   EGFRNONAA >60 >60       Lactic Acid:   Recent Labs   Lab 02/04/19  1803 02/04/19  2200 02/05/19  0314   LACTATE 4.1* 3.4* 2.6*     Magnesium:   Recent Labs   Lab 02/05/19  0314   MG 2.2     Lab Results   Component Value Date    DDIMER <0.19 02/04/2019       TSH:   Recent Labs   Lab 02/04/19  1004   TSH 6.851*     Urine Studies: UPT negative   UDS negative    Blood in process x 2    Rapid throat negative  Flu negative     Significant Imaging:     CXR The lungs are clear, with normal appearance of pulmonary vasculature and no pleural effusion or pneumothorax.    The cardiac silhouette is normal in size. The hilar and mediastinal contours are unremarkable.    Bones are intact.    EKG NSR    CTA chest   1.  Negative for acute pulmonary emboli.  Negative for thoracic aortic aneurysm or dissection.

## 2019-02-06 NOTE — DISCHARGE SUMMARY
[facility] Hospital Medicine  Discharge Summary      Patient Name: Gurjit Bundy  MRN: [id number]  Admission Date: 2/4/2019  Hospital Length of Stay: 0 days  Discharge Date and Time:  02/06/2019 12:39 PM  Attending Physician: Emilee Enriquez MD   Discharging Provider: Kim Mancia NP  Primary Care Provider: Dong Calloway MD      HPI:   23 yo female patient with PMHX of asthma and PCOS. She presented to ER with c/o SOB. She was seen by Dr Gu last week abd given IM and po steroids. She was also using nebuilizer at home without relief. Coughing up clear sputum. SOB and CLIFT[place]. Went to Dr Oneil office today as she was not getting better and he sent her to ER. She is afebrile and has slight elevated WBC but has been on steroids oupt. CXR clear. She was placed in observation for steroids, nebs and O2 support. Still very SOB with minimal exertion.                * No surgery found *      Hospital Course:   She is sitting up in bed. She reports that she doesn't feel much better. Feels weak and SOB. No longer wheezing though. She reports that she was seen by Dr Gu yesterday. He reordered her abx. Remains on medrol 80mg IV q 8hr and nebs every 4 hr. No fever. WBC is a little higher but also on steroids. CTA shows no pathology. She does report relief with phenergan/codiene cough syrup     2/6/19  She reports that she is feeling better today. No wheezing. Still SOB with getting up to bathroom. POX 99% on RA. Steroids are making her flushed and swollen. WBC is increasing but on medrol 40mg IV every 8hr.      Consults:   Consults (From admission, onward)        Status Ordering Provider     Inpatient consult to Pulmonology  Once     Provider:  Chuck Gu MD    Acknowledged DENA ZUNIGA          * Mild intermittent asthma with acute exacerbation      Stop abx, no consilidation or fevers. Likely viral if any underlying infectious process. WBC 15K, on steroids as outpatient. No  fevers.  Cont nebs and wean steroids  Not on supplemental O2 but SOB with minimal exertion in the room     I see Dr. Gu ordered CTA. D-dimer was normal. She is not hypoxic or tachycardic. Awaiting his note to determine why... She is currently table other than lactic acid trending up.   2/5/19    CTA was negative  abx reordered per Dr Gu  Wean steroids today.    D/c on steroid taper  breo  No more abx     Elevated lactic acid level    Lactic acid 2.2 --> 4.1>> now down to 2.6 d/c  Start NS 150cc/hr  Vitals are stable.         Hypophosphatemia    Lab Results   Component Value Date    CALCIUM 9.2 02/06/2019    PHOS 2.3 (L) 02/05/2019     Replaced yesterday .     Hypokalemia    Potassium replaced   4.3 today.       Elevated TSH    F/u free t4--if normal I would not start synthroid at this time  Repeat TSH as outpatient.         Final Active Diagnoses:    Diagnosis Date Noted POA    PRINCIPAL PROBLEM:  Mild intermittent asthma with acute exacerbation [J45.21] 02/04/2019 Yes    Leukemoid reaction [D72.823] 02/06/2019 Yes    Elevated TSH [R79.89] 02/04/2019 Yes    Hypokalemia [E87.6] 02/04/2019 Yes    Hypophosphatemia [E83.39] 02/04/2019 Yes    Elevated lactic acid level [R79.89] 02/04/2019 No      Problems Resolved During this Admission:       Discharged Condition: good    Disposition:     Follow Up:    Patient Instructions:   No discharge procedures on file.    Significant Diagnostic Studies:       Significant Labs:   CBC:   Recent Labs   Lab 02/05/19 0314 02/06/19  0556   WBC 17.68* 24.12*   HGB 13.0 13.4   HCT 39.7 42.0    291     CMP:   Recent Labs   Lab 02/05/19  0314 02/06/19  0556    139   K 4.3 3.8    106   CO2 19* 23   * 205*   BUN 10 10   CREATININE 0.8 1.0   CALCIUM 9.6 9.2   PROT 7.2 6.9   ALBUMIN 3.7 3.5   BILITOT 0.2 0.3   ALKPHOS 78 76   AST 9* 8*   ALT 16 13   ANIONGAP 12 10   EGFRNONAA >60 >60       Lactic Acid:   Recent Labs   Lab 02/04/19  1803 02/04/19  2201  02/05/19  0314   LACTATE 4.1* 3.4* 2.6*     Magnesium:   Recent Labs   Lab 02/05/19  0314   MG 2.2     Lab Results   Component Value Date    DDIMER <0.19 02/04/2019       TSH:   Recent Labs   Lab 02/04/19  1004   TSH 6.851*     Urine Studies: UPT negative   UDS negative  Recent Labs   Lab 02/04/19  1038   COLORU Yellow   APPEARANCEUA Clear   PHUR 8.0   SPECGRAV 1.015   PROTEINUA Negative   GLUCUA Negative   KETONESU Negative   BILIRUBINUA Negative   OCCULTUA Negative   NITRITE Negative   UROBILINOGEN Negative   LEUKOCYTESUR 1+*   RBCUA 4   WBCUA 10*   BACTERIA Few*   SQUAMEPITHEL 15       Blood in process x 2    Rapid throat negative  Flu negative     Significant Imaging:     CXR The lungs are clear, with normal appearance of pulmonary vasculature and no pleural effusion or pneumothorax.    The cardiac silhouette is normal in size. The hilar and mediastinal contours are unremarkable.    Bones are intact.    EKG NSR    CTA chest   1.  Negative for acute pulmonary emboli.  Negative for thoracic aortic aneurysm or dissection.      Pending Diagnostic Studies:     None         Medications:  Reconciled Home Medications:      Medication List      START taking these medications    fluticasone-vilanterol 200-25 mcg/dose Dsdv diskus inhaler  Commonly known as:  BREO ELLIPTA  Inhale 1 puff into the lungs once daily. Controller     predniSONE 10 MG tablet  Commonly known as:  DELTASONE  Take 40 mg by mouth for 4 days then decrease to 30 mg by mouth for 3 days then decrease to 20 mg by mouth for 2 days then decrease to 10 mg by mouth for 1 day then discontinue        CONTINUE taking these medications    azelastine 137 mcg (0.1 %) nasal spray  Commonly known as:  ASTELIN  1 spray (137 mcg total) by Nasal route 2 (two) times daily.     metFORMIN 500 MG 24 hr tablet  Commonly known as:  GLUCOPHAGE XR  Take 1 tablet (500 mg total) by mouth daily with breakfast.     * PROAIR HFA 90 mcg/actuation inhaler  Generic drug:  albuterol      * albuterol 2.5 mg /3 mL (0.083 %) nebulizer solution  Commonly known as:  PROVENTIL         * This list has 2 medication(s) that are the same as other medications prescribed for you. Read the directions carefully, and ask your doctor or other care provider to review them with you.            STOP taking these medications    norethindrone 5 mg Tab  Commonly known as:  AYGESTIN            Indwelling Lines/Drains at time of discharge:   Lines/Drains/Airways          None          Time spent on the discharge of patient: 20 minutes  Patient was seen and examined on the date of discharge and determined to be suitable for discharge.         Kim Mancia, ELHAM  Department of Hospital Medicine  [facility]

## 2019-02-06 NOTE — PLAN OF CARE
Problem: Adult Inpatient Plan of Care  Goal: Plan of Care Review  Outcome: Ongoing (interventions implemented as appropriate)  Patient resting with some complaints of cough which are stated to be relieved with PRN cough medicine. IV fluids and antibiotics administered as ordered. VS stable. A&O, Independent in room. Family at bedside. No acute changes noted. Plan of care reviewed and agreed upon with patient and family.

## 2019-02-06 NOTE — PROGRESS NOTES
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  Her discharge has not yet been posted so I'm not able to review it with her at this time.      Lia West, PharmD

## 2019-02-06 NOTE — ASSESSMENT & PLAN NOTE
Lab Results   Component Value Date    CALCIUM 9.2 02/06/2019    PHOS 3.9 02/06/2019     Replaced yesterday .

## 2019-02-06 NOTE — SUBJECTIVE & OBJECTIVE
Interval History: looks much better   very pleasant  Wants to go home     Objective:     Vital Signs (Most Recent):  Temp: 97.4 °F (36.3 °C) (02/06/19 1134)  Pulse: 90 (02/06/19 1134)  Resp: 18 (02/06/19 1134)  BP: 122/81 (02/06/19 1134)  SpO2: 96 % (02/06/19 1134) Vital Signs (24h Range):  Temp:  [97 °F (36.1 °C)-97.8 °F (36.6 °C)] 97.4 °F (36.3 °C)  Pulse:  [] 90  Resp:  [16-20] 18  SpO2:  [96 %-99 %] 96 %  BP: (102-132)/(53-81) 122/81     Weight: 113.9 kg (251 lb)  Body mass index is 39.31 kg/m².      Intake/Output Summary (Last 24 hours) at 2/6/2019 1144  Last data filed at 2/6/2019 0745  Gross per 24 hour   Intake 1240 ml   Output --   Net 1240 ml       Physical Exam   Constitutional: She is oriented to person, place, and time. She appears well-developed and well-nourished. She is cooperative.  Non-toxic appearance. She does not appear ill. No distress.   HENT:   Head: Normocephalic and atraumatic.   Right Ear: Hearing, tympanic membrane, external ear and ear canal normal.   Left Ear: Hearing, tympanic membrane, external ear and ear canal normal.   Nose: Nose normal. No mucosal edema, rhinorrhea or nasal deformity. No epistaxis. Right sinus exhibits no maxillary sinus tenderness and no frontal sinus tenderness. Left sinus exhibits no maxillary sinus tenderness and no frontal sinus tenderness.   Mouth/Throat: Uvula is midline, oropharynx is clear and moist and mucous membranes are normal. No trismus in the jaw. Normal dentition. No uvula swelling. No posterior oropharyngeal erythema.   Eyes: Conjunctivae and lids are normal. No scleral icterus.   Sclera clear bilat   Neck: Trachea normal, full passive range of motion without pain and phonation normal. Neck supple.   Cardiovascular: Normal rate, regular rhythm, normal heart sounds, intact distal pulses and normal pulses.   Pulmonary/Chest: No accessory muscle usage. No tachypnea. She is in respiratory distress (mild to moderate). She has decreased breath  sounds in the right lower field and the left lower field. She has no wheezes. She has no rhonchi.   Abdominal: Soft. Normal appearance and bowel sounds are normal. She exhibits no distension. There is no tenderness.   Musculoskeletal: Normal range of motion. She exhibits no edema or deformity.   Neurological: She is alert and oriented to person, place, and time. She exhibits normal muscle tone. Coordination normal.   Skin: Skin is warm, dry and intact. She is not diaphoretic. No pallor.   Psychiatric: She has a normal mood and affect. Her speech is normal and behavior is normal. Judgment and thought content normal. Cognition and memory are normal.   Nursing note and vitals reviewed.      Vents:       Lines/Drains/Airways     Peripheral Intravenous Line                 Peripheral IV - Single Lumen 02/04/19 1037 Left Antecubital 2 days                Significant Labs:    CBC/Anemia Profile:  Recent Labs   Lab 02/05/19  0314 02/06/19  0556   WBC 17.68* 24.12*   HGB 13.0 13.4   HCT 39.7 42.0    291   MCV 88 89   RDW 13.5 13.8        Chemistries:  Recent Labs   Lab 02/05/19  0314 02/06/19  0556    139   K 4.3 3.8    106   CO2 19* 23   BUN 10 10   CREATININE 0.8 1.0   CALCIUM 9.6 9.2   ALBUMIN 3.7 3.5   PROT 7.2 6.9   BILITOT 0.2 0.3   ALKPHOS 78 76   ALT 16 13   AST 9* 8*   MG 2.2  --    PHOS 2.3*  --        All pertinent labs within the past 24 hours have been reviewed.    Significant Imaging:  I have reviewed all pertinent imaging results/findings within the past 24 hours.

## 2019-02-06 NOTE — ASSESSMENT & PLAN NOTE
Lab Results   Component Value Date    CALCIUM 9.2 02/06/2019    PHOS 2.3 (L) 02/05/2019     Replaced yesterday .

## 2019-02-06 NOTE — PROGRESS NOTES
[facility]  Pulmonology  Progress Note    Patient Name: Gurjit Bundy  MRN: [id number]  Admission Date: 2/4/2019  Hospital Length of Stay: 0 days  Code Status: Full Code  Attending Provider: Emilee Enriquez MD  Primary Care Provider: Dong Calloway MD   Principal Problem: Mild intermittent asthma with acute exacerbation    Subjective:     Interval History: looks much better   very pleasant  Wants to go home     Objective:     Vital Signs (Most Recent):  Temp: 97.4 °F (36.3 °C) (02/06/19 1134)  Pulse: 90 (02/06/19 1134)  Resp: 18 (02/06/19 1134)  BP: 122/81 (02/06/19 1134)  SpO2: 96 % (02/06/19 1134) Vital Signs (24h Range):  Temp:  [97 °F (36.1 °C)-97.8 °F (36.6 °C)] 97.4 °F (36.3 °C)  Pulse:  [] 90  Resp:  [16-20] 18  SpO2:  [96 %-99 %] 96 %  BP: (102-132)/(53-81) 122/81     Weight: 113.9 kg (251 lb)  Body mass index is 39.31 kg/m².      Intake/Output Summary (Last 24 hours) at 2/6/2019 1144  Last data filed at 2/6/2019 0745  Gross per 24 hour   Intake 1240 ml   Output --   Net 1240 ml       Physical Exam   Constitutional: She is oriented to person, place, and time. She appears well-developed and well-nourished. She is cooperative.  Non-toxic appearance. She does not appear ill. No distress.   HENT:   Head: Normocephalic and atraumatic.   Right Ear: Hearing, tympanic membrane, external ear and ear canal normal.   Left Ear: Hearing, tympanic membrane, external ear and ear canal normal.   Nose: Nose normal. No mucosal edema, rhinorrhea or nasal deformity. No epistaxis. Right sinus exhibits no maxillary sinus tenderness and no frontal sinus tenderness. Left sinus exhibits no maxillary sinus tenderness and no frontal sinus tenderness.   Mouth/Throat: Uvula is midline, oropharynx is clear and moist and mucous membranes are normal. No trismus in the jaw. Normal dentition. No uvula swelling. No posterior oropharyngeal erythema.   Eyes: Conjunctivae and lids are normal. No scleral  icterus.   Sclera clear bilat   Neck: Trachea normal, full passive range of motion without pain and phonation normal. Neck supple.   Cardiovascular: Normal rate, regular rhythm, normal heart sounds, intact distal pulses and normal pulses.   Pulmonary/Chest: No accessory muscle usage. No tachypnea. She is in respiratory distress (mild to moderate). She has decreased breath sounds in the right lower field and the left lower field. She has no wheezes. She has no rhonchi.   Abdominal: Soft. Normal appearance and bowel sounds are normal. She exhibits no distension. There is no tenderness.   Musculoskeletal: Normal range of motion. She exhibits no edema or deformity.   Neurological: She is alert and oriented to person, place, and time. She exhibits normal muscle tone. Coordination normal.   Skin: Skin is warm, dry and intact. She is not diaphoretic. No pallor.   Psychiatric: She has a normal mood and affect. Her speech is normal and behavior is normal. Judgment and thought content normal. Cognition and memory are normal.   Nursing note and vitals reviewed.      Vents:       Lines/Drains/Airways     Peripheral Intravenous Line                 Peripheral IV - Single Lumen 02/04/19 1037 Left Antecubital 2 days                Significant Labs:    CBC/Anemia Profile:  Recent Labs   Lab 02/05/19  0314 02/06/19  0556   WBC 17.68* 24.12*   HGB 13.0 13.4   HCT 39.7 42.0    291   MCV 88 89   RDW 13.5 13.8        Chemistries:  Recent Labs   Lab 02/05/19  0314 02/06/19  0556    139   K 4.3 3.8    106   CO2 19* 23   BUN 10 10   CREATININE 0.8 1.0   CALCIUM 9.6 9.2   ALBUMIN 3.7 3.5   PROT 7.2 6.9   BILITOT 0.2 0.3   ALKPHOS 78 76   ALT 16 13   AST 9* 8*   MG 2.2  --    PHOS 2.3*  --        All pertinent labs within the past 24 hours have been reviewed.    Significant Imaging:  I have reviewed all pertinent imaging results/findings within the past 24 hours.    Assessment/Plan:     Elevated lactic acid level     Secondary to hyperadrenergic state     Hypophosphatemia    Will check   Replaced will look at level  po4 low however calcium nl will check PTH intact        Elevated WBC (24,000)     Chuck Gu MD  Pulmonology  [facility]

## 2019-02-06 NOTE — PLAN OF CARE
Problem: Adult Inpatient Plan of Care  Goal: Plan of Care Review  Outcome: Outcome(s) achieved Date Met: 02/06/19  Patient stable. Denies pain. Denies SOB. States she is feeling much better. Breath sounds clear bilaterally. Remains free from falls. Spouse at bedside. Patient to be discharged to home/self-care. Will follow up with NP outpatient. Discharge instructions given. Patient voiced understanding.

## 2019-02-09 LAB
BACTERIA BLD CULT: NORMAL
BACTERIA BLD CULT: NORMAL

## 2019-02-14 ENCOUNTER — OFFICE VISIT (OUTPATIENT)
Dept: INTERNAL MEDICINE | Facility: CLINIC | Age: 24
End: 2019-02-14
Payer: COMMERCIAL

## 2019-02-14 VITALS
HEIGHT: 67 IN | WEIGHT: 262.81 LBS | HEART RATE: 74 BPM | BODY MASS INDEX: 41.25 KG/M2 | DIASTOLIC BLOOD PRESSURE: 80 MMHG | OXYGEN SATURATION: 98 % | SYSTOLIC BLOOD PRESSURE: 102 MMHG | RESPIRATION RATE: 16 BRPM

## 2019-02-14 DIAGNOSIS — R79.89 ELEVATED TSH: ICD-10-CM

## 2019-02-14 DIAGNOSIS — J45.21 MILD INTERMITTENT ASTHMA WITH ACUTE EXACERBATION: Primary | ICD-10-CM

## 2019-02-14 PROCEDURE — 99999 PR PBB SHADOW E&M-EST. PATIENT-LVL III: CPT | Mod: PBBFAC,,, | Performed by: NURSE PRACTITIONER

## 2019-02-14 PROCEDURE — 99999 PR PBB SHADOW E&M-EST. PATIENT-LVL III: ICD-10-PCS | Mod: PBBFAC,,, | Performed by: NURSE PRACTITIONER

## 2019-02-14 PROCEDURE — 99203 OFFICE O/P NEW LOW 30 MIN: CPT | Mod: S$GLB,,, | Performed by: NURSE PRACTITIONER

## 2019-02-14 PROCEDURE — 3008F BODY MASS INDEX DOCD: CPT | Mod: CPTII,S$GLB,, | Performed by: NURSE PRACTITIONER

## 2019-02-14 PROCEDURE — 99203 PR OFFICE/OUTPT VISIT, NEW, LEVL III, 30-44 MIN: ICD-10-PCS | Mod: S$GLB,,, | Performed by: NURSE PRACTITIONER

## 2019-02-14 PROCEDURE — 3008F PR BODY MASS INDEX (BMI) DOCUMENTED: ICD-10-PCS | Mod: CPTII,S$GLB,, | Performed by: NURSE PRACTITIONER

## 2019-02-14 NOTE — PROGRESS NOTES
Subjective:       Patient ID: Gurjit Xiong is a 24 y.o. female.    Chief Complaint: Establish Care and Follow-up (hosp f/u; asthma)    HPI: Pt presents to clinic today known to me from hospital f/u,. She reports that she is doing well. No SOB. No CLIFTON. No more wheezing. No fever. No cough. She is using her breo daily and still on 10 day steroids taper.     Her partner has ripped all the carpet out and placed new floor. She is breathing better. Still has a pet. Got the hepa filter and had air purifier and using in bedroom., Still has dog.     Past Medical History:   Diagnosis Date    Asthma     PCOS (polycystic ovarian syndrome)        Past Surgical History:   Procedure Laterality Date    WISDOM TOOTH EXTRACTION      WRIST SURGERY      WRIST SURGERY      right       Family History   Problem Relation Age of Onset    No Known Problems Mother     Hypertension Father     Dementia Father     Breast cancer Neg Hx     Colon cancer Neg Hx     Ovarian cancer Neg Hx        Social History     Socioeconomic History    Marital status: Single     Spouse name: None    Number of children: None    Years of education: None    Highest education level: None   Social Needs    Financial resource strain: None    Food insecurity - worry: None    Food insecurity - inability: None    Transportation needs - medical: None    Transportation needs - non-medical: None   Occupational History    None   Tobacco Use    Smoking status: Former Smoker     Packs/day: 0.50     Years: 5.00     Pack years: 2.50    Smokeless tobacco: Never Used   Substance and Sexual Activity    Alcohol use: Yes     Comment: socially    Drug use: No    Sexual activity: No     Partners: Female     Birth control/protection: None     Comment: single   Other Topics Concern    None   Social History Narrative                Current Outpatient Medications   Medication Sig Dispense Refill    albuterol (ACCUNEB) 1.25 mg/3 mL Nebu Take 1.25 mg by  nebulization every 6 (six) hours as needed. Rescue      albuterol (PROVENTIL) 2.5 mg /3 mL (0.083 %) nebulizer solution       azelastine (ASTELIN) 137 mcg (0.1 %) nasal spray 1 spray (137 mcg total) by Nasal route 2 (two) times daily. 30 mL 0    fluticasone-vilanterol (BREO ELLIPTA) 200-25 mcg/dose DsDv diskus inhaler Inhale 1 puff into the lungs once daily. Controller 60 each 3    ipratropium (ATROVENT) 0.02 % nebulizer solution Take 500 mcg by nebulization 4 (four) times daily. Rescue      norgestimate-ethinyl estradiol (ORTHO-CYCLEN) 0.25-35 mg-mcg per tablet Take 1 tablet by mouth once daily.      ondansetron (ZOFRAN) 4 MG tablet Take 1 tablet (4 mg total) by mouth daily as needed for Nausea (adults can take one or two tablets every 8 hours). 10 tablet 0    predniSONE (DELTASONE) 10 MG tablet Take 4 tablets by mouth for 4 days then decrease to 3 tablets by mouth for 3 days then decrease to 2 tablets by mouth for 2 days then decrease to 1 tablet by mouth for 1 day then discontinue 30 tablet 0    PROAIR HFA 90 mcg/actuation inhaler        No current facility-administered medications for this visit.        Review of patient's allergies indicates:   Allergen Reactions    Bactrim [sulfamethoxazole-trimethoprim] Shortness Of Breath    Bactrim [sulfamethoxazole-trimethoprim] Rash       Review of Systems   Constitutional: Negative for chills, fatigue, fever and unexpected weight change.   HENT: Negative for congestion, ear pain, sore throat and trouble swallowing.    Eyes: Negative for pain and visual disturbance.   Respiratory: Negative for cough, chest tightness and shortness of breath.    Cardiovascular: Negative for chest pain, palpitations and leg swelling.   Gastrointestinal: Negative for abdominal distention, abdominal pain, constipation, diarrhea and vomiting.   Genitourinary: Negative for difficulty urinating, dysuria, flank pain, frequency and hematuria.   Musculoskeletal: Negative for back pain,  gait problem, joint swelling, neck pain and neck stiffness.   Skin: Negative for rash and wound.   Neurological: Negative for dizziness, seizures, speech difficulty, light-headedness and headaches.       Objective:      Physical Exam   Constitutional: She is oriented to person, place, and time. She appears well-developed and well-nourished.   HENT:   Head: Normocephalic and atraumatic.   Right Ear: External ear normal.   Left Ear: External ear normal.   Nose: Nose normal.   Mouth/Throat: Oropharynx is clear and moist. No oropharyngeal exudate.   Eyes: Conjunctivae and EOM are normal. Pupils are equal, round, and reactive to light.   Neck: Normal range of motion. Neck supple. No thyromegaly present.   Cardiovascular: Normal rate, regular rhythm, normal heart sounds and intact distal pulses.   No murmur heard.  Pulmonary/Chest: Effort normal and breath sounds normal. No stridor. No respiratory distress. She has no wheezes. She has no rales.   Abdominal: Soft. Bowel sounds are normal. She exhibits no distension and no mass. There is no tenderness. There is no guarding.   Musculoskeletal: Normal range of motion. She exhibits no edema.   Lymphadenopathy:     She has no cervical adenopathy.   Neurological: She is alert and oriented to person, place, and time.   Skin: Skin is warm and dry. Capillary refill takes less than 2 seconds.   Psychiatric: She has a normal mood and affect. Her behavior is normal. Judgment and thought content normal.   Nursing note and vitals reviewed.      Assessment:       1. Mild intermittent asthma with acute exacerbation    2. Elevated TSH        Plan:     Problem List Items Addressed This Visit     Mild intermittent asthma with acute exacerbation - Primary    Elevated TSH    Relevant Orders    TSH        Cont breo, finish steroid taper.   F/u tsh/cbc 3 weeks. ( no cbc now as she is still on steroids)  Discussed contrave vs saxenda for wt loss if thyroid ok or no relief with thyroid treatment.

## 2019-03-13 ENCOUNTER — LAB VISIT (OUTPATIENT)
Dept: LAB | Facility: HOSPITAL | Age: 24
End: 2019-03-13
Attending: NURSE PRACTITIONER
Payer: COMMERCIAL

## 2019-03-13 DIAGNOSIS — J45.902 MODERATE ASTHMA WITH STATUS ASTHMATICUS, UNSPECIFIED WHETHER PERSISTENT: ICD-10-CM

## 2019-03-13 DIAGNOSIS — R79.89 ELEVATED TSH: ICD-10-CM

## 2019-03-13 LAB
BASOPHILS # BLD AUTO: 0.08 K/UL
BASOPHILS NFR BLD: 0.8 %
DIFFERENTIAL METHOD: NORMAL
EOSINOPHIL # BLD AUTO: 0.2 K/UL
EOSINOPHIL NFR BLD: 2.4 %
ERYTHROCYTE [DISTWIDTH] IN BLOOD BY AUTOMATED COUNT: 13.3 %
HCT VFR BLD AUTO: 41.1 %
HGB BLD-MCNC: 13.3 G/DL
LYMPHOCYTES # BLD AUTO: 2.7 K/UL
LYMPHOCYTES NFR BLD: 27 %
MCH RBC QN AUTO: 28.5 PG
MCHC RBC AUTO-ENTMCNC: 32.4 G/DL
MCV RBC AUTO: 88 FL
MONOCYTES # BLD AUTO: 0.7 K/UL
MONOCYTES NFR BLD: 6.7 %
NEUTROPHILS # BLD AUTO: 6.1 K/UL
NEUTROPHILS NFR BLD: 62.6 %
PLATELET # BLD AUTO: 326 K/UL
PMV BLD AUTO: 11.7 FL
RBC # BLD AUTO: 4.66 M/UL
TSH SERPL DL<=0.005 MIU/L-ACNC: 3.4 UIU/ML
WBC # BLD AUTO: 9.8 K/UL

## 2019-03-13 PROCEDURE — 36415 COLL VENOUS BLD VENIPUNCTURE: CPT

## 2019-03-13 PROCEDURE — 84443 ASSAY THYROID STIM HORMONE: CPT

## 2019-03-13 PROCEDURE — 85025 COMPLETE CBC W/AUTO DIFF WBC: CPT

## 2019-03-14 ENCOUNTER — TELEPHONE (OUTPATIENT)
Dept: INTERNAL MEDICINE | Facility: CLINIC | Age: 24
End: 2019-03-14

## 2019-03-14 DIAGNOSIS — E66.9 OBESITY, UNSPECIFIED CLASSIFICATION, UNSPECIFIED OBESITY TYPE, UNSPECIFIED WHETHER SERIOUS COMORBIDITY PRESENT: Primary | ICD-10-CM

## 2019-03-14 NOTE — TELEPHONE ENCOUNTER
Patient notified prescription has been faxed to pharmacy. Also instructed to print Contrave coupon online to assist with cost. Patient verbalized understanding.

## 2019-03-14 NOTE — TELEPHONE ENCOUNTER
----- Message from Hayley Smith sent at 3/14/2019 12:15 PM CDT -----  Contact: self   Gurjit Xiong  MRN: [id number]  : 1995  PCP: Primary Doctor No  Home Phone      [phone]  Work Phone      Not on file.  Mobile          [phone]    MESSAGE:   She requested to speak to nurse regarding medication phoned in. The pt did not fill the medication due to issue at pharmacy. [place].    Phone # [phone]    [id number] - [place], [place] - [address] [facility]

## 2019-03-14 NOTE — TELEPHONE ENCOUNTER
Ok rx sent as long as she does not have a family hx of thyroid ca or an allergy to victoza she can start. Start at 0.6mg inj daily and increase by 0.6 weekly till max of 3mg daily. Only adjust dose weekly because the N/V will be terrible if not. F/u 3 months for weight check up

## 2019-03-14 NOTE — TELEPHONE ENCOUNTER
Patient states copayment for Saxenda is $1100. Requesting Contrave be sent to pharmacy. Please advise.

## 2019-03-14 NOTE — TELEPHONE ENCOUNTER
We can go either way. Saxenda is an inj. contrave is a pill. Does she know what she would like to try. She may want to call insurance and see if they cover any and what would be her cost... We have coupons for contrave but cash price is still $/month

## 2019-03-14 NOTE — TELEPHONE ENCOUNTER
Patient requesting to know if you will prescribed weight loss medication due to normal thyroid result. Please advise. Last note states:Discussed contrave vs saxenda for wt loss if thyroid ok or no relief with thyroid treatment

## 2019-04-06 ENCOUNTER — HOSPITAL ENCOUNTER (EMERGENCY)
Facility: HOSPITAL | Age: 24
Discharge: HOME | End: 2019-04-07
Attending: EMERGENCY MEDICINE
Payer: COMMERCIAL

## 2019-04-06 VITALS
DIASTOLIC BLOOD PRESSURE: 79 MMHG | SYSTOLIC BLOOD PRESSURE: 126 MMHG | HEART RATE: 107 BPM | HEIGHT: 67 IN | WEIGHT: 240 LBS | OXYGEN SATURATION: 100 % | BODY MASS INDEX: 37.67 KG/M2 | RESPIRATION RATE: 18 BRPM

## 2019-04-06 DIAGNOSIS — R06.02 SHORTNESS OF BREATH: ICD-10-CM

## 2019-04-06 DIAGNOSIS — J45.41 MODERATE PERSISTENT ASTHMA WITH ACUTE EXACERBATION: Primary | ICD-10-CM

## 2019-04-06 PROCEDURE — 63600175 PHARM REV CODE 636 W HCPCS: Performed by: PHYSICIAN ASSISTANT

## 2019-04-06 PROCEDURE — 94640 AIRWAY INHALATION TREATMENT: CPT

## 2019-04-06 PROCEDURE — 96365 THER/PROPH/DIAG IV INF INIT: CPT

## 2019-04-06 PROCEDURE — 94644 CONT INHLJ TX 1ST HOUR: CPT

## 2019-04-06 PROCEDURE — 96375 TX/PRO/DX INJ NEW DRUG ADDON: CPT

## 2019-04-06 PROCEDURE — 96366 THER/PROPH/DIAG IV INF ADDON: CPT

## 2019-04-06 PROCEDURE — 25000242 PHARM REV CODE 250 ALT 637 W/ HCPCS: Performed by: PHYSICIAN ASSISTANT

## 2019-04-06 PROCEDURE — 99284 EMERGENCY DEPT VISIT MOD MDM: CPT | Mod: 25

## 2019-04-06 RX ORDER — ALBUTEROL SULFATE 2.5 MG/.5ML
10 SOLUTION RESPIRATORY (INHALATION)
Status: COMPLETED | OUTPATIENT
Start: 2019-04-06 | End: 2019-04-06

## 2019-04-06 RX ORDER — MAGNESIUM SULFATE HEPTAHYDRATE 40 MG/ML
2 INJECTION, SOLUTION INTRAVENOUS
Status: COMPLETED | OUTPATIENT
Start: 2019-04-06 | End: 2019-04-06

## 2019-04-06 RX ORDER — PREDNISONE 20 MG/1
40 TABLET ORAL DAILY
Qty: 10 TABLET | Refills: 0 | Status: SHIPPED | OUTPATIENT
Start: 2019-04-06 | End: 2019-04-11

## 2019-04-06 RX ORDER — METHYLPREDNISOLONE SOD SUCC 125 MG
125 VIAL (EA) INJECTION
Status: COMPLETED | OUTPATIENT
Start: 2019-04-06 | End: 2019-04-06

## 2019-04-06 RX ADMIN — METHYLPREDNISOLONE SODIUM SUCCINATE 125 MG: 125 INJECTION, POWDER, FOR SOLUTION INTRAMUSCULAR; INTRAVENOUS at 09:04

## 2019-04-06 RX ADMIN — MAGNESIUM SULFATE IN WATER 2 G: 40 INJECTION, SOLUTION INTRAVENOUS at 09:04

## 2019-04-06 RX ADMIN — ALBUTEROL SULFATE 10 MG: 2.5 SOLUTION RESPIRATORY (INHALATION) at 09:04

## 2019-04-07 NOTE — ED TRIAGE NOTES
Patient presents to the ED with reports of having shortness of breath and wheezing that started this evening. Reports having a hx of asthma and is compliant with her rx medications. Patient states having had upper respiratory symptoms such as cough and wheezing over the past few days and has been using her albuterol inhaler with moderate relief. Patient is tachypnic but is speaking in full sentences.     Review of patient's allergies indicates:   Allergen Reactions    Bactrim [sulfamethoxazole-trimethoprim] Shortness Of Breath    Bactrim [sulfamethoxazole-trimethoprim] Rash        Patient has verified the spelling of their name and  on armband.   APPEARANCE: Patient is alert, oriented x 4, and appears restless.  SKIN: Skin is normal for race, warm, and dry. Normal skin turgor and mucous membranes moist.  CARDIAC: Normal rate and no murmur heard.   RESPIRATORY: +Tachypnea. Ins/expriatory wheezing auscultated bilaterally both front and back. Respirations are equal.    MUSCLE: Full ROM. No bony tenderness or soft tissue tenderness. No obvious deformity.  PERIPHERAL VASCULAR: peripheral pulses present. Normal cap refill. No edema. Warm to touch.  MENTAL STATUS: awake, alert and aware of environment.  ENT: EARS: no obvious drainage. NOSE: no active bleeding. THROAT: no redness or swelling.

## 2019-04-07 NOTE — ED NOTES
Patient is calm. Bilateral breath sounds are clear to auscultation. Patient is speaking in full sentences and does not appear distressed. Skin is pink, warm, and dry. Waiting on further orders.

## 2019-04-07 NOTE — ED PROVIDER NOTES
Encounter Date: 4/6/2019       History     Chief Complaint   Patient presents with    Asthma     To ER with c/o SOB and wheezing.  Pt used inhaler and nebulizer with no relief.     24-year-old female with PMH of asthma presents the ED with complaints of shortness of breath and wheezing that began about 1 hr ago.  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  She states this is similar to her previous asthma attacks and believes it was triggered by perfume.  She used her albuterol inhaler 3 times and took 1 ipratropium nebulizer treatment with no improvement.  Denies chest pain, chest tightness.    The history is provided by the patient. No  was used.     Review of patient's allergies indicates:   Allergen Reactions    Bactrim [sulfamethoxazole-trimethoprim] Shortness Of Breath    Bactrim [sulfamethoxazole-trimethoprim] Rash     Past Medical History:   Diagnosis Date    Asthma     PCOS (polycystic ovarian syndrome)      Past Surgical History:   Procedure Laterality Date    WISDOM TOOTH EXTRACTION      WRIST SURGERY      WRIST SURGERY      right     Family History   Problem Relation Age of Onset    No Known Problems Mother     Hypertension Father     Dementia Father     Breast cancer Neg Hx     Colon cancer Neg Hx     Ovarian cancer Neg Hx      Social History     Tobacco Use    Smoking status: Former Smoker     Packs/day: 0.50     Years: 5.00     Pack years: 2.50    Smokeless tobacco: Never Used   Substance Use Topics    Alcohol use: Yes     Comment: socially    Drug use: No     Review of Systems   Constitutional: Negative for chills and fever.   Respiratory: Positive for shortness of breath and wheezing. Negative for cough and chest tightness.    Cardiovascular: Negative for chest pain.   All other systems reviewed and are negative.      Physical Exam     Initial Vitals [04/06/19 2119]   BP Pulse Resp Temp SpO2   137/76 92 (!) 24 -- 99 %      MAP       --         Physical Exam    Nursing  note and vitals reviewed.  Constitutional: Vital signs are normal. She appears well-developed and well-nourished. She appears distressed (Moderate).   HENT:   Head: Normocephalic and atraumatic.   Nose: Nose normal.   Eyes: Conjunctivae and lids are normal.   Neck: Normal range of motion and phonation normal.   Cardiovascular: Normal rate, regular rhythm and normal heart sounds.   Pulmonary/Chest: Accessory muscle usage present. Tachypnea noted. She has wheezes (Expiratory wheezes throughout). She has no rales.   Increased work of breathing noted  Supraclavicular retractions noted   Abdominal: Soft. Normal appearance and bowel sounds are normal.   Musculoskeletal: Normal range of motion.   Neurological: She is alert and oriented to person, place, and time.   Skin: Skin is warm and dry.   Psychiatric: She has a normal mood and affect. Her speech is normal and behavior is normal. Judgment and thought content normal. Cognition and memory are normal.         ED Course   Procedures  Labs Reviewed - No data to display       Imaging Results    None          Medical Decision Making:   Differential Diagnosis:   Asthma exacerbation  ED Management:  No emergent work up warranted. The patient was working here in the ED when she inhaled a known asthma trigger/ irritant. 1 hr continuous albuterol nebulizer treatment, IV Solu-Medrol and magnesium Ordered.  10:53 PM  Upon reexamination after completion of continuous albuterol treatment, the patient's work of breathing has returned to normal, she is no longer tachypneic or wheezing.  Discharge pending completion of IV magnesium.  11:36PM  Magnesium complete. Patient states resolution of symptoms. She will be discharged with rx for prednisone and instructed to follow up with PCP. Return to ED with any new or worsening symptoms. She states understanding and agreement with treatment plan.                       Clinical Impression:         ICD-10-CM ICD-9-CM   1. Moderate persistent  asthma with acute exacerbation J45.41 493.92   2. Shortness of breath R06.02 786.05                                Ila Carrera PA-C  04/07/19 0023

## 2019-04-23 ENCOUNTER — TELEPHONE (OUTPATIENT)
Dept: INTERNAL MEDICINE | Facility: CLINIC | Age: 24
End: 2019-04-23

## 2019-04-23 NOTE — TELEPHONE ENCOUNTER
----- Message from Paloma Pérez sent at 2019 11:25 AM CDT -----  Contact: Self  Gurjit Xiong  MRN: [id number]  : 1995  PCP: Primary Doctor No  Home Phone      [phone]  Work Phone      Not on file.  Mobile          [phone]    MESSAGE:   Requesting appointment with Kim for tomorrow for problems with her asthma.  Please call if we can fit her into tomorrow's schedule.    Phone: [phone] or [phone]

## 2019-04-24 ENCOUNTER — TELEPHONE (OUTPATIENT)
Dept: INTERNAL MEDICINE | Facility: CLINIC | Age: 24
End: 2019-04-24

## 2019-04-24 NOTE — TELEPHONE ENCOUNTER
Patient requesting to know if we perform the scratch test at this office. Patient notified we do not perform testing in this office. Patient informed [facility] [place] that can possibly perform testing. Patient states she would rather call Dr. Bruno's office in [place] to schedule appointment because it is closer to her residence. Patient instructed to contact our office if any assistance is needed to schedule appointment. Patient verbalized understanding.

## 2019-04-24 NOTE — TELEPHONE ENCOUNTER
----- Message from Hayley Smith sent at 2019  1:58 PM CDT -----  Contact: self   Gurjit Xiong  MRN: [id number]  : 1995  PCP: Primary Doctor No  Home Phone      [phone]  Work Phone      Not on file.  Mobile          [phone]    MESSAGE:   Pt would like to know if she may have allergy testing within the office. The pt stated allergery symptoms.have increased. The had an appointment today but had to cancel due to work.     Phone # [phone]     [id number] - [place], [place] - [address]

## 2019-07-02 ENCOUNTER — TELEPHONE (OUTPATIENT)
Dept: ORTHOPEDICS | Facility: CLINIC | Age: 24
End: 2019-07-02

## 2019-07-02 ENCOUNTER — TELEPHONE (OUTPATIENT)
Dept: INTERNAL MEDICINE | Facility: CLINIC | Age: 24
End: 2019-07-02

## 2019-07-02 DIAGNOSIS — J45.21 MILD INTERMITTENT ASTHMA WITH ACUTE EXACERBATION: Primary | ICD-10-CM

## 2019-07-02 NOTE — TELEPHONE ENCOUNTER
----- Message from Paloma Pérez sent at 2019 11:32 AM CDT -----  Contact: Self  Gurjit Xiong  MRN: [id number]  : 1995  PCP: Primary Doctor No  Home Phone      [phone]  Work Phone      Not on file.  Mobile          [phone]      MESSAGE:   Would like to speak to nurse to get referral to allergist that is in [facility]. Their   Fax number is  [phone] and the name is  Dr. Griggs.    Phone: [phone]

## 2019-07-02 NOTE — TELEPHONE ENCOUNTER
Requesting referral to allergist at [facility] for asthma. Last visit with Kim Mancia NP 2/19. Please sign referral.

## 2019-07-02 NOTE — TELEPHONE ENCOUNTER
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 Short told her to call and have you scheduled her and that is was an emergency. Pt states she goes in to work at 2 pm.

## 2019-07-12 NOTE — TELEPHONE ENCOUNTER
Patient needs her ProAir inhaler filled today. Kim is out of clinic until Monday. No other providers in clinic @ this time due to weather precautions. Refill phoned in to [facility] in [place] per patient request.   Requested Prescriptions     Pending Prescriptions Disp Refills    albuterol (PROAIR HFA) 90 mcg/actuation inhaler 18 g 0     Sig: Inhale 2 puffs into the lungs every 4 (four) hours as needed.

## 2019-07-15 RX ORDER — ALBUTEROL SULFATE 90 UG/1
2 AEROSOL, METERED RESPIRATORY (INHALATION) EVERY 4 HOURS PRN
Qty: 18 G | Refills: 0 | Status: SHIPPED | OUTPATIENT
Start: 2019-07-15

## 2023-01-18 ENCOUNTER — HOSPITAL ENCOUNTER (EMERGENCY)
Facility: HOSPITAL | Age: 28
Discharge: HOME | End: 2023-01-19
Attending: EMERGENCY MEDICINE

## 2023-01-18 VITALS
HEART RATE: 87 BPM | DIASTOLIC BLOOD PRESSURE: 81 MMHG | RESPIRATION RATE: 14 BRPM | SYSTOLIC BLOOD PRESSURE: 124 MMHG | TEMPERATURE: 98 F | OXYGEN SATURATION: 99 %

## 2023-01-18 DIAGNOSIS — M25.472 PAIN AND SWELLING OF LEFT ANKLE: Primary | ICD-10-CM
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
## 2023-01-18 DIAGNOSIS — M25.572 PAIN AND SWELLING OF LEFT ANKLE: Primary | ICD-10-CM

## 2023-01-18 PROCEDURE — 99283 EMERGENCY DEPT VISIT LOW MDM: CPT

## 2023-01-19 NOTE — ED PROVIDER NOTES
"Encounter Date: 1/18/2023       History     Chief Complaint   Patient presents with    Fall     Slip and fall while at work. C/O left ankle pain. Swelling noted to lateral ankle.      29 y/o female presents with left lateral ankle pain after standing and foot was "asleep" and it rolled. She states it felt like it rolled again and then she fell. Left ankle pain and swelling to lateral aspect. Can bear weight with discomfort. Incident happened while at work.     The history is provided by the patient. No  was used.   Fall  The accident occurred just prior to arrival. The fall occurred while standing. She fell from a height of 3 to 5 ft. She landed on A hard floor.   Review of patient's allergies indicates:   Allergen Reactions    Bactrim [sulfamethoxazole-trimethoprim] Shortness Of Breath    Bactrim [sulfamethoxazole-trimethoprim] Rash     Past Medical History:   Diagnosis Date    Asthma     PCOS (polycystic ovarian syndrome)      Past Surgical History:   Procedure Laterality Date    WISDOM TOOTH EXTRACTION      WRIST SURGERY      WRIST SURGERY      right     Family History   Problem Relation Age of Onset    No Known Problems Mother     Hypertension Father     Dementia Father     Breast cancer Neg Hx     Colon cancer Neg Hx     Ovarian cancer Neg Hx      Social History     Tobacco Use    Smoking status: Former     Packs/day: 0.50     Years: 5.00     Pack years: 2.50     Types: Cigarettes    Smokeless tobacco: Never   Substance Use Topics    Alcohol use: Yes     Comment: socially    Drug use: No     Review of Systems   Musculoskeletal:  Positive for joint swelling (left ankle).   All other systems reviewed and are negative.    Physical Exam     Initial Vitals [01/18/23 2242]   BP Pulse Resp Temp SpO2   124/81 87 14 98 °F (36.7 °C) 99 %      MAP       --         Physical Exam    Constitutional: She appears well-developed and well-nourished.   Cardiovascular:  Regular rhythm and intact distal pulses.  " "         Pulmonary/Chest: No respiratory distress.   Musculoskeletal:      Left ankle: Swelling present. No deformity or lacerations. Tenderness present over the lateral malleolus. Decreased range of motion.      Comments: Ambulatory with limp.     All other adjacent joints otherwise normal   No pain over the left lateral foot     Neurological: She is alert and oriented to person, place, and time.   Skin: Skin is warm and dry.   Psychiatric: She has a normal mood and affect.       ED Course   Procedures  Labs Reviewed - No data to display       Imaging Results              X-Ray Ankle Complete Left (In process)                      Medications - No data to display  Medical Decision Making:   Initial Assessment:   27 y/o female presents with left lateral ankle pain after standing and foot was "asleep" and it rolled. She states it felt like it rolled again and then she fell. Left ankle pain and swelling to lateral aspect. Can bear weight with discomfort. Incident happened while at work.   Differential Diagnosis:   Left ankle sprain, left ankle fracture  Independently Interpreted Test(s):   I have ordered and independently interpreted X-rays - see summary below.       <> Summary of X-Ray Reading(s): No acute fractures noted  Clinical Tests:   Radiological Study: Ordered and Reviewed  ED Management:  Xray  no acute findings. Ace wrap applied, ice, elevation. She took her own ibuprofen.                         Clinical Impression:   Final diagnoses:  [W19.XXXA] Fall  [M25.572, M25.472] Pain and swelling of left ankle (Primary)        ED Disposition Condition    Discharge Stable          ED Prescriptions    None       Follow-up Information       Follow up With Specialties Details Why Contact Info    primary care provider  Call in 1 week As needed, If symptoms worsen              GRETA Sidhu  01/18/23 6009    "

## 2023-01-19 NOTE — DISCHARGE INSTRUCTIONS
Rest, ice, compress with ace wrap, elevate. Ibuprofen 800mg every 8 hours for pain as needed. Supportive shoes. If symptoms not improving in 1-2 weeks, follow up with primary care provider.